# Patient Record
Sex: MALE | Race: BLACK OR AFRICAN AMERICAN
[De-identification: names, ages, dates, MRNs, and addresses within clinical notes are randomized per-mention and may not be internally consistent; named-entity substitution may affect disease eponyms.]

---

## 2017-01-20 ENCOUNTER — HOSPITAL ENCOUNTER (EMERGENCY)
Dept: HOSPITAL 62 - ER | Age: 42
LOS: 1 days | Discharge: HOME | End: 2017-01-21
Payer: OTHER GOVERNMENT

## 2017-01-20 DIAGNOSIS — M25.512: Primary | ICD-10-CM

## 2017-01-20 DIAGNOSIS — M79.1: ICD-10-CM
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
## 2017-01-20 PROCEDURE — 99283 EMERGENCY DEPT VISIT LOW MDM: CPT
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
## 2017-01-21 VITALS — DIASTOLIC BLOOD PRESSURE: 74 MMHG | SYSTOLIC BLOOD PRESSURE: 107 MMHG

## 2018-03-02 ENCOUNTER — HOSPITAL ENCOUNTER (EMERGENCY)
Dept: HOSPITAL 62 - ER | Age: 43
Discharge: HOME | End: 2018-03-02
Payer: OTHER GOVERNMENT

## 2018-03-02 VITALS — SYSTOLIC BLOOD PRESSURE: 121 MMHG | DIASTOLIC BLOOD PRESSURE: 91 MMHG

## 2018-03-02 DIAGNOSIS — G89.29: ICD-10-CM

## 2018-03-02 DIAGNOSIS — M54.5: Primary | ICD-10-CM

## 2018-03-02 PROCEDURE — 72110 X-RAY EXAM L-2 SPINE 4/>VWS: CPT

## 2018-03-02 PROCEDURE — 96372 THER/PROPH/DIAG INJ SC/IM: CPT

## 2018-03-02 PROCEDURE — 99283 EMERGENCY DEPT VISIT LOW MDM: CPT

## 2018-03-02 NOTE — RADIOLOGY REPORT (SQ)
EXAM DESCRIPTION:  L SPINE WHOLE



COMPLETED DATE/TIME:  3/2/2018 5:42 pm



REASON FOR STUDY:  low back pain



COMPARISON:  None.



NUMBER OF VIEWS:  Five views including obliques.



TECHNIQUE:  AP, lateral, oblique, and sacral radiographic images acquired of the lumbar spine.



LIMITATIONS:  None.



FINDINGS:  MINERALIZATION: Normal.

SEGMENTATION: Normal.  No transitional anatomy.

ALIGNMENT: Normal.

VERTEBRAE: Maintained height.  No fracture or worrisome bone lesion.

DISCS: Multilevel disc space narrowing with osteophytes.

POSTERIOR ELEMENTS: Pedicles and facets are intact.  No pars defect or posterior arch defects. Facet 
arthropathy is present.

HARDWARE: None in the spine.

PARASPINAL SOFT TISSUES: Normal.

PELVIS: Intact as visualized. No fractures or worrisome bone lesions. SI joints intact.

OTHER: No other significant finding.



IMPRESSION:  SPONDYLOSIS WITHOUT BONE LESION OR FRACTURE.



TECHNICAL DOCUMENTATION:  JOB ID:  [id number]

[id number]

 2011 [employer]- All Rights Reserved



Reading location - IP/workstation name: [email or web address]

## 2019-12-26 ENCOUNTER — HOSPITAL ENCOUNTER (INPATIENT)
Dept: HOSPITAL 62 - ER | Age: 44
LOS: 7 days | Discharge: HOME | DRG: 897 | End: 2020-01-02
Attending: INTERNAL MEDICINE | Admitting: INTERNAL MEDICINE
Payer: OTHER GOVERNMENT

## 2019-12-26 DIAGNOSIS — G47.33: ICD-10-CM

## 2019-12-26 DIAGNOSIS — N18.9: ICD-10-CM

## 2019-12-26 DIAGNOSIS — E11.22: ICD-10-CM

## 2019-12-26 DIAGNOSIS — Z79.4: ICD-10-CM

## 2019-12-26 DIAGNOSIS — F10.129: Primary | ICD-10-CM

## 2019-12-26 DIAGNOSIS — F13.929: ICD-10-CM

## 2019-12-26 DIAGNOSIS — Z88.6: ICD-10-CM
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
## 2019-12-26 DIAGNOSIS — Z91.14: ICD-10-CM

## 2019-12-26 DIAGNOSIS — Z87.820: ICD-10-CM

## 2019-12-26 DIAGNOSIS — N17.9: ICD-10-CM

## 2019-12-26 DIAGNOSIS — F17.210: ICD-10-CM

## 2019-12-26 DIAGNOSIS — I12.9: ICD-10-CM

## 2019-12-26 DIAGNOSIS — F43.10: ICD-10-CM

## 2019-12-26 DIAGNOSIS — F32.9: ICD-10-CM

## 2019-12-26 LAB
ADD MANUAL DIFF: NO
ALBUMIN SERPL-MCNC: 4.9 G/DL (ref 3.5–5)
ALP SERPL-CCNC: 53 U/L (ref 38–126)
ANION GAP SERPL CALC-SCNC: 16 MMOL/L (ref 5–19)
APAP SERPL-MCNC: < 10 UG/ML (ref 10–30)
APPEARANCE UR: CLEAR
APTT PPP: (no result) S
AST SERPL-CCNC: 127 U/L (ref 17–59)
BARBITURATES UR QL SCN: NEGATIVE
BASE EXCESS BLDV CALC-SCNC: -1.7 MMOL/L
BASOPHILS # BLD AUTO: 0.1 10^3/UL (ref 0–0.2)
BASOPHILS NFR BLD AUTO: 1.4 % (ref 0–2)
BILIRUB DIRECT SERPL-MCNC: 0.1 MG/DL (ref 0–0.4)
BILIRUB SERPL-MCNC: 0.4 MG/DL (ref 0.2–1.3)
BILIRUB UR QL STRIP: NEGATIVE
BUN SERPL-MCNC: 9 MG/DL (ref 7–20)
CALCIUM: 9 MG/DL (ref 8.4–10.2)
CHLORIDE SERPL-SCNC: 104 MMOL/L (ref 98–107)
CK SERPL-CCNC: 705 U/L (ref 55–170)
CO2 SERPL-SCNC: 24 MMOL/L (ref 22–30)
EOSINOPHIL # BLD AUTO: 0.1 10^3/UL (ref 0–0.6)
EOSINOPHIL NFR BLD AUTO: 3.1 % (ref 0–6)
ERYTHROCYTE [DISTWIDTH] IN BLOOD BY AUTOMATED COUNT: 13.2 % (ref 11.5–14)
GLUCOSE SERPL-MCNC: 129 MG/DL (ref 75–110)
GLUCOSE UR STRIP-MCNC: NEGATIVE MG/DL
HCO3 BLDV-SCNC: 24.9 MMOL/L (ref 20–32)
HCT VFR BLD CALC: 40.4 % (ref 37.9–51)
HGB BLD-MCNC: 13.6 G/DL (ref 13.5–17)
KETONES UR STRIP-MCNC: NEGATIVE MG/DL
LYMPHOCYTES # BLD AUTO: 1.9 10^3/UL (ref 0.5–4.7)
LYMPHOCYTES NFR BLD AUTO: 50.4 % (ref 13–45)
MCH RBC QN AUTO: 30.3 PG (ref 27–33.4)
MCHC RBC AUTO-ENTMCNC: 33.7 G/DL (ref 32–36)
MCV RBC AUTO: 90 FL (ref 80–97)
METHADONE UR QL SCN: NEGATIVE
MONOCYTES # BLD AUTO: 0.4 10^3/UL (ref 0.1–1.4)
MONOCYTES NFR BLD AUTO: 11 % (ref 3–13)
NEUTROPHILS # BLD AUTO: 1.3 10^3/UL (ref 1.7–8.2)
NEUTS SEG NFR BLD AUTO: 34.1 % (ref 42–78)
PCO2 BLDV: 49 MMHG (ref 35–63)
PCP UR QL SCN: NEGATIVE
PH BLDV: 7.32 [PH] (ref 7.3–7.42)
PH UR STRIP: 6 [PH] (ref 5–9)
PLATELET # BLD: 207 10^3/UL (ref 150–450)
POTASSIUM SERPL-SCNC: 4.1 MMOL/L (ref 3.6–5)
PROT SERPL-MCNC: 9.2 G/DL (ref 6.3–8.2)
PROT UR STRIP-MCNC: 30 MG/DL
RBC # BLD AUTO: 4.49 10^6/UL (ref 4.35–5.55)
SALICYLATES SERPL-MCNC: < 1 MG/DL (ref 2–20)
SP GR UR STRIP: 1
TOTAL CELLS COUNTED % (AUTO): 100 %
URINE AMPHETAMINES SCREEN: NEGATIVE
URINE BENZODIAZEPINES SCREEN: NEGATIVE
URINE COCAINE SCREEN: NEGATIVE
URINE MARIJUANA (THC) SCREEN: NEGATIVE
UROBILINOGEN UR-MCNC: NEGATIVE MG/DL (ref ?–2)
WBC # BLD AUTO: 3.8 10^3/UL (ref 4–10.5)

## 2019-12-26 PROCEDURE — 99291 CRITICAL CARE FIRST HOUR: CPT

## 2019-12-26 PROCEDURE — 82550 ASSAY OF CK (CPK): CPT

## 2019-12-26 PROCEDURE — 51702 INSERT TEMP BLADDER CATH: CPT

## 2019-12-26 PROCEDURE — 83880 ASSAY OF NATRIURETIC PEPTIDE: CPT

## 2019-12-26 PROCEDURE — 94660 CPAP INITIATION&MGMT: CPT

## 2019-12-26 PROCEDURE — 94002 VENT MGMT INPAT INIT DAY: CPT

## 2019-12-26 PROCEDURE — 71045 X-RAY EXAM CHEST 1 VIEW: CPT

## 2019-12-26 PROCEDURE — 70450 CT HEAD/BRAIN W/O DYE: CPT

## 2019-12-26 PROCEDURE — 96374 THER/PROPH/DIAG INJ IV PUSH: CPT

## 2019-12-26 PROCEDURE — 94003 VENT MGMT INPAT SUBQ DAY: CPT

## 2019-12-26 PROCEDURE — 5A1935Z RESPIRATORY VENTILATION, LESS THAN 24 CONSECUTIVE HOURS: ICD-10-PCS

## 2019-12-26 PROCEDURE — 93005 ELECTROCARDIOGRAM TRACING: CPT

## 2019-12-26 PROCEDURE — 87040 BLOOD CULTURE FOR BACTERIA: CPT

## 2019-12-26 PROCEDURE — 83735 ASSAY OF MAGNESIUM: CPT

## 2019-12-26 PROCEDURE — 96361 HYDRATE IV INFUSION ADD-ON: CPT

## 2019-12-26 PROCEDURE — 74177 CT ABD & PELVIS W/CONTRAST: CPT

## 2019-12-26 PROCEDURE — 84443 ASSAY THYROID STIM HORMONE: CPT

## 2019-12-26 PROCEDURE — 96375 TX/PRO/DX INJ NEW DRUG ADDON: CPT

## 2019-12-26 PROCEDURE — 99292 CRITICAL CARE ADDL 30 MIN: CPT

## 2019-12-26 PROCEDURE — 81001 URINALYSIS AUTO W/SCOPE: CPT

## 2019-12-26 PROCEDURE — 85025 COMPLETE CBC W/AUTO DIFF WBC: CPT

## 2019-12-26 PROCEDURE — 84484 ASSAY OF TROPONIN QUANT: CPT

## 2019-12-26 PROCEDURE — 84100 ASSAY OF PHOSPHORUS: CPT

## 2019-12-26 PROCEDURE — 84439 ASSAY OF FREE THYROXINE: CPT

## 2019-12-26 PROCEDURE — 36415 COLL VENOUS BLD VENIPUNCTURE: CPT

## 2019-12-26 PROCEDURE — 93010 ELECTROCARDIOGRAM REPORT: CPT

## 2019-12-26 PROCEDURE — 71260 CT THORAX DX C+: CPT

## 2019-12-26 PROCEDURE — 72125 CT NECK SPINE W/O DYE: CPT

## 2019-12-26 PROCEDURE — 84481 FREE ASSAY (FT-3): CPT

## 2019-12-26 PROCEDURE — 80307 DRUG TEST PRSMV CHEM ANLYZR: CPT

## 2019-12-26 PROCEDURE — C9113 INJ PANTOPRAZOLE SODIUM, VIA: HCPCS

## 2019-12-26 PROCEDURE — 83605 ASSAY OF LACTIC ACID: CPT

## 2019-12-26 PROCEDURE — 99285 EMERGENCY DEPT VISIT HI MDM: CPT

## 2019-12-26 PROCEDURE — 0BH17EZ INSERTION OF ENDOTRACHEAL AIRWAY INTO TRACHEA, VIA NATURAL OR ARTIFICIAL OPENING: ICD-10-PCS

## 2019-12-26 PROCEDURE — 82803 BLOOD GASES ANY COMBINATION: CPT

## 2019-12-26 PROCEDURE — 74018 RADEX ABDOMEN 1 VIEW: CPT

## 2019-12-26 PROCEDURE — 80053 COMPREHEN METABOLIC PANEL: CPT

## 2019-12-26 PROCEDURE — 80048 BASIC METABOLIC PNL TOTAL CA: CPT

## 2019-12-26 RX ADMIN — PROPOFOL PRN MLS/HR: 10 INJECTION, EMULSION INTRAVENOUS at 20:40
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
## 2019-12-26 NOTE — RADIOLOGY REPORT (SQ)
EXAM DESCRIPTION:





CLINICAL HISTORY:

44 years Male, intubated



COMPARISON:

CT chest from today.



FINDINGS:

Mild-to-moderate cardiomegaly. Mild mediastinal widening mostly

due to vascular structures as seen on CT. No significant findings

on chest x-ray. CT chest demonstrated mild-to-moderate bilateral

lower lobe consolidations. Endotracheal tube well above the

brady. No suspicious pneumothorax.

## 2019-12-26 NOTE — RADIOLOGY REPORT (SQ)
EXAM DESCRIPTION: 



XR ABDOMEN 1 VIEW (KUB)



COMPLETED DATE/TME:  12/26/2019 00:00



CLINICAL HISTORY: 



44 years, Male, verify placement of ng tube 



COMPARISON:

None.



NUMBER OF VIEWS:

1



TECHNIQUE:

AP abdomen



LIMITATIONS:

None.



FINDINGS:



Residual contrast in the collecting system. Overlying cardiac

leads and wires. The bowel gas pattern is nonspecific. Evaluation

for free air limited on a supine view. Tip of the enteric tube in

the left upper quadrant/stomach.



IMPRESSION:



Nonspecific bowel gas pattern. Tip of the enteric tube in the

stomach.

 



copyright 2011 [employer]- All Rights Reserved

## 2019-12-26 NOTE — RADIOLOGY REPORT (SQ)
EXAM DESCRIPTION: 



CT CHEST WITH IV CONTRAST, CT ABDOMEN PELVIS WITH IV CONTRAST



COMPLETED DATE/TME:  12/26/2019 20:48



CLINICAL HISTORY: 



44 years, Male, ams



COMPARISON:

None.



TECHNIQUE:

744  Images stored on PACS.

 

All CT scanners at this facility use dose modulation, iterative

reconstruction, and/or weight based dosing when appropriate to

reduce radiation dose to as low as reasonably achievable (ALARA).





[facility]: Dose Right [facility]: CareDose   [facility]: Dose Right    [facility]:

Teradose 4D    [facility]: Smart Technologies



LIMITATIONS:

None.



FINDINGS:



CT chest:



Endotracheal tube partially visualized. No mediastinal or hilar

adenopathy. Heart and pericardium are unremarkable. Small amount

of fluid in the distal esophagus. Osseous structures of the

thorax are grossly intact. No pneumothorax. Dependent atelectasis

in each lung base.



CT abdomen/pelvis:



Osseous structures are grossly intact. Fatty infiltrative change

to the liver. The spleen, adrenal glands, pancreas, kidneys are

unremarkable. The gallbladder is present. No gross evidence for

bowel obstruction. Fat-containing periumbilical hernia. Normal

appendix. López catheter in urinary bladder. Air in the bladder

likely reflects iatrogenic air. No free air or free fluid.





IMPRESSION:



No acute intrathoracic process. No acute intra-abdominal/pelvic

process.

 

TECHNICAL DOCUMENTATION:



Quality ID # 436: Final reports with documentation of one or more

dose reduction techniques (e.g., Automated exposure control,

adjustment of the mA and/or kV according to patient size, use of

iterative reconstruction technique)



copyright 2011 [employer]- All Rights Reserved

## 2019-12-26 NOTE — RADIOLOGY REPORT (SQ)
EXAM DESCRIPTION: 



CT HEAD WITHOUT IV CONTRAST



COMPLETED DATE/TME:  12/26/2019 20:45



CLINICAL HISTORY: 



44 years, Male, ams



COMPARISON:

None.



TECHNIQUE:

209  Images stored on PACS.

 

All CT scanners at this facility use dose modulation, iterative

reconstruction, and/or weight based dosing when appropriate to

reduce radiation dose to as low as reasonably achievable (ALARA).





[facility]: Dose Right [facility]: CareDose   [facility]: Dose Right    [facility]:

Teradose 4D    [facility]: Smart [employer]



LIMITATIONS:

None.



FINDINGS:



The globes are intact. Partial visualization of endotracheal and

enteric tubes. Mucosal thickening of the ethmoid air cells.

Air-fluid level left maxillary sinus. No displaced or depressed

skull fracture. No intra or extra-axial hemorrhage. CT is limited

for evaluation of acute infarct. No CT evidence for large or

territorial acute infarct. No mass. No midline shift





IMPRESSION:



No acute intracranial abnormality

 

TECHNICAL DOCUMENTATION:



Quality ID # 436: Final reports with documentation of one or more

dose reduction techniques (e.g., Automated exposure control,

adjustment of the mA and/or kV according to patient size, use of

iterative reconstruction technique)



copyright 2011 [employer]- All Rights Reserved

## 2019-12-26 NOTE — RADIOLOGY REPORT (SQ)
EXAM DESCRIPTION:



 CT of the cervical spine without contrast.



CLINICAL HISTORY: 



44 years  Male  ams



COMPARISON: 



None



TECHNIQUE: 



Axial images without contrast. Sagittal coronal reconstruction.

This exam was performed according to our departmental

dose-optimization program, which includes automated exposure

control, adjustment of the mA and/or kV according to patient size

and/or use of iterative reconstruction technique..



FINDINGS: 



No obvious acute fracture dislocation. Minimal retrolisthesis at

C5-6 where probably degenerative and associated with mild diffuse

disc bulge and stenosis. No foraminal stenosis. Other levels are

unremarkable. No suspicious prevertebral soft tissue swelling.

Endotracheal tube is identified.



IMPRESSION: 



No significant findings in the cervical spine. Mild degenerative

changes at C5-6.

## 2019-12-26 NOTE — RADIOLOGY REPORT (SQ)
EXAM DESCRIPTION: 



CT CHEST WITH IV CONTRAST, CT ABDOMEN PELVIS WITH IV CONTRAST



COMPLETED DATE/TME:  12/26/2019 20:48



CLINICAL HISTORY: 



44 years, Male, ams



COMPARISON:

None.



TECHNIQUE:

744  Images stored on PACS.

 

All CT scanners at this facility use dose modulation, iterative

reconstruction, and/or weight based dosing when appropriate to

reduce radiation dose to as low as reasonably achievable (ALARA).





[facility]: Dose Right [facility]: CareDose   [facility]: Dose Right    [facility]:

Teradose 4D    [facility]: [employer]



LIMITATIONS:

None.



FINDINGS:



CT chest:



Endotracheal tube partially visualized. No mediastinal or hilar

adenopathy. Heart and pericardium are unremarkable. Small amount

of fluid in the distal esophagus. Osseous structures of the

thorax are grossly intact. No pneumothorax. Dependent atelectasis

in each lung base.



CT abdomen/pelvis:



Osseous structures are grossly intact. Fatty infiltrative change

to the liver. The spleen, adrenal glands, pancreas, kidneys are

unremarkable. The gallbladder is present. No gross evidence for

bowel obstruction. Fat-containing periumbilical hernia. Normal

appendix. López catheter in urinary bladder. Air in the bladder

likely reflects iatrogenic air. No free air or free fluid.





IMPRESSION:



No acute intrathoracic process. No acute intra-abdominal/pelvic

process.

 

TECHNICAL DOCUMENTATION:



Quality ID # 436: Final reports with documentation of one or more

dose reduction techniques (e.g., Automated exposure control,

adjustment of the mA and/or kV according to patient size, use of

iterative reconstruction technique)



copyright 2011 [employer]- All Rights Reserved

## 2019-12-27 LAB
ADD MANUAL DIFF: NO
ALBUMIN SERPL-MCNC: 3.8 G/DL (ref 3.5–5)
ALP SERPL-CCNC: 39 U/L (ref 38–126)
ANION GAP SERPL CALC-SCNC: 15 MMOL/L (ref 5–19)
ARTERIAL BLOOD FIO2: (no result)
ARTERIAL BLOOD H2CO3: 1.12 MMOL/L (ref 1.05–1.35)
ARTERIAL BLOOD HCO3: 20.5 MMOL/L (ref 20–24)
ARTERIAL BLOOD PCO2: 37.1 MMHG (ref 35–45)
ARTERIAL BLOOD PH: 7.36 (ref 7.35–7.45)
ARTERIAL BLOOD PO2: 239.9 MMHG (ref 80–100)
ARTERIAL BLOOD TOTAL CO2: 21.7 MMOL/L (ref 23–27)
AST SERPL-CCNC: 122 U/L (ref 17–59)
BASE EXCESS BLDA CALC-SCNC: -4.3 MMOL/L
BASOPHILS # BLD AUTO: 0 10^3/UL (ref 0–0.2)
BASOPHILS NFR BLD AUTO: 0.7 % (ref 0–2)
BILIRUB DIRECT SERPL-MCNC: 0.2 MG/DL (ref 0–0.4)
BILIRUB SERPL-MCNC: 0.4 MG/DL (ref 0.2–1.3)
BUN SERPL-MCNC: 9 MG/DL (ref 7–20)
CALCIUM: 7.5 MG/DL (ref 8.4–10.2)
CHLORIDE SERPL-SCNC: 110 MMOL/L (ref 98–107)
CO2 SERPL-SCNC: 19 MMOL/L (ref 22–30)
EOSINOPHIL # BLD AUTO: 0 10^3/UL (ref 0–0.6)
EOSINOPHIL NFR BLD AUTO: 0.3 % (ref 0–6)
ERYTHROCYTE [DISTWIDTH] IN BLOOD BY AUTOMATED COUNT: 13.3 % (ref 11.5–14)
FREE T4 (FREE THYROXINE): 1.08 NG/DL (ref 0.78–2.19)
GLUCOSE SERPL-MCNC: 120 MG/DL (ref 75–110)
HCT VFR BLD CALC: 35.6 % (ref 37.9–51)
HGB BLD-MCNC: 11.9 G/DL (ref 13.5–17)
LYMPHOCYTES # BLD AUTO: 1.1 10^3/UL (ref 0.5–4.7)
LYMPHOCYTES NFR BLD AUTO: 29.6 % (ref 13–45)
MCH RBC QN AUTO: 30.3 PG (ref 27–33.4)
MCHC RBC AUTO-ENTMCNC: 33.5 G/DL (ref 32–36)
MCV RBC AUTO: 90 FL (ref 80–97)
MONOCYTES # BLD AUTO: 0.1 10^3/UL (ref 0.1–1.4)
MONOCYTES NFR BLD AUTO: 3.4 % (ref 3–13)
NEUTROPHILS # BLD AUTO: 2.4 10^3/UL (ref 1.7–8.2)
NEUTS SEG NFR BLD AUTO: 66 % (ref 42–78)
PHOSPHATE SERPL-MCNC: 4.3 MG/DL (ref 2.5–4.5)
PLATELET # BLD: 181 10^3/UL (ref 150–450)
POTASSIUM SERPL-SCNC: 4.4 MMOL/L (ref 3.6–5)
PROT SERPL-MCNC: 7.5 G/DL (ref 6.3–8.2)
RBC # BLD AUTO: 3.94 10^6/UL (ref 4.35–5.55)
SAO2 % BLDA: 99.5 % (ref 94–98)
T3FREE SERPL-MCNC: 4.98 PG/ML (ref 2.77–5.27)
TOTAL CELLS COUNTED % (AUTO): 100 %
WBC # BLD AUTO: 3.7 10^3/UL (ref 4–10.5)

## 2019-12-27 RX ADMIN — MULTIVITAMIN TABLET SCH TAB: TABLET at 12:01

## 2019-12-27 RX ADMIN — Medication SCH: at 14:16

## 2019-12-27 RX ADMIN — LORAZEPAM PRN MG: 2 INJECTION INTRAMUSCULAR; INTRAVENOUS at 18:55

## 2019-12-27 RX ADMIN — LORAZEPAM PRN MG: 2 INJECTION INTRAMUSCULAR; INTRAVENOUS at 14:16

## 2019-12-27 RX ADMIN — Medication SCH MG: at 12:01

## 2019-12-27 RX ADMIN — Medication SCH: at 21:42

## 2019-12-27 RX ADMIN — FOLIC ACID SCH MG: 1 TABLET ORAL at 12:01

## 2019-12-27 RX ADMIN — PROPOFOL PRN MLS/HR: 10 INJECTION, EMULSION INTRAVENOUS at 01:43

## 2019-12-27 RX ADMIN — PROPOFOL PRN MLS/HR: 10 INJECTION, EMULSION INTRAVENOUS at 05:32

## 2019-12-27 RX ADMIN — LORAZEPAM PRN MG: 2 INJECTION INTRAMUSCULAR; INTRAVENOUS at 23:50

## 2019-12-27 RX ADMIN — HEPARIN SODIUM SCH UNIT: 5000 INJECTION, SOLUTION INTRAVENOUS; SUBCUTANEOUS at 14:16

## 2019-12-27 RX ADMIN — Medication SCH: at 08:25

## 2019-12-27 RX ADMIN — LORAZEPAM PRN MG: 2 INJECTION INTRAMUSCULAR; INTRAVENOUS at 11:11

## 2019-12-27 RX ADMIN — HEPARIN SODIUM SCH UNIT: 5000 INJECTION, SOLUTION INTRAVENOUS; SUBCUTANEOUS at 21:40

## 2019-12-27 NOTE — PDOC CRITICAL CARE PROG REPORT
General


Date:: 12/27/19 - Critical Care Attending Note


Resuscitation Status: Full Code


Events in the past 12 to 24 Hours:: 





I extubated the pt today. Pt has episoded of voluntary shaking and jerking of 

his lower legs that last a few seconds but are not followed by a post-ictal 

state. Wife states that pt has PTSD and a TBI. He has BENNY for which he is 

supposed to be on CPAP. [facility] states [facility] but is non-

compliant with his meds and his CPAP.Staff reports hearing pt say that he wants 

to die and that he "took a bunch of pills".


Reason for ICU Addmission:: AMS, unresponsive, ETOH intoxication





Physical Exam


Vital Signs: 


                                        











Temp Pulse Resp BP Pulse Ox


 


 99.5 F   101 H  14   123/77   100 


 


 12/27/19 12:00  12/27/19 12:00  12/27/19 12:00  12/27/19 12:00  12/27/19 12:00








                                 Intake & Output











 12/26/19 12/27/19 12/28/19





 06:59 06:59 06:59


 


Intake Total  1837 1000


 


Output Total  2200 1345


 


Balance  -363 -345


 


Weight  105.7 kg 








                                  Weight/Height





Weight                           105.7 kg


Height                           6 ft 3 in








General appearance: PRESENT: no acute distress, well-developed, well-nourished


Head exam: PRESENT: atraumatic, normocephalic


Respiratory exam: PRESENT: clear to auscultation elisa, unlabored


GI/Abdominal exam: PRESENT: soft, other - NTND


Extremities exam: PRESENT: other - no edema


Psychiatric exam: PRESENT: unusual affect





Laboratory/Radiographs


Laboratory Results: 


                                        





                                 12/27/19 01:56 





                                 12/27/19 01:56 





                                        











  12/26/19 12/26/19 12/26/19





  20:12 20:12 20:12


 


WBC  3.8 L  


 


RBC  4.49  


 


Hgb  13.6  


 


Hct  40.4  


 


MCV  90  


 


MCH  30.3  


 


MCHC  33.7  


 


RDW  13.2  


 


Plt Count  207  


 


Seg Neutrophils %  34.1 L  


 


Carbonic Acid   


 


HCO3/H2CO3 Ratio   


 


ABG pH   


 


ABG pCO2   


 


ABG pO2   


 


ABG HCO3   


 


ABG O2 Saturation   


 


ABG Base Excess   


 


VBG pH   


 


VBG pCO2   


 


VBG HCO3   


 


VBG Base Excess   


 


FiO2   


 


Sodium   144.3 


 


Potassium   4.1 


 


Chloride   104 


 


Carbon Dioxide   24 


 


Anion Gap   16 


 


BUN   9 


 


Creatinine   1.50 H 


 


Est GFR ( Amer)   > 60 


 


Glucose   129 H 


 


Lactic Acid    2.9 H


 


Calcium   9.0 


 


Phosphorus   


 


Magnesium   


 


Total Bilirubin   0.4 


 


AST   127 H 


 


Alkaline Phosphatase   53 


 


Total Protein   9.2 H 


 


Albumin   4.9 


 


TSH   


 


Free T4   


 


Free T3 pg/mL   


 


Urine Color   


 


Urine Appearance   


 


Urine pH   


 


Ur Specific Gravity   


 


Urine Protein   


 


Urine Glucose (UA)   


 


Urine Ketones   


 


Urine Blood   


 


Urine RBC (Auto)   














  12/26/19 12/26/19 12/26/19





  20:12 20:12 20:47


 


WBC   


 


RBC   


 


Hgb   


 


Hct   


 


MCV   


 


MCH   


 


MCHC   


 


RDW   


 


Plt Count   


 


Seg Neutrophils %   


 


Carbonic Acid   


 


HCO3/H2CO3 Ratio   


 


ABG pH   


 


ABG pCO2   


 


ABG pO2   


 


ABG HCO3   


 


ABG O2 Saturation   


 


ABG Base Excess   


 


VBG pH  7.32  


 


VBG pCO2  49.0  


 


VBG HCO3  24.9  


 


VBG Base Excess  -1.7  


 


FiO2   


 


Sodium   


 


Potassium   


 


Chloride   


 


Carbon Dioxide   


 


Anion Gap   


 


BUN   


 


Creatinine   


 


Est GFR (African Amer)   


 


Glucose   


 


Lactic Acid   


 


Calcium   


 


Phosphorus   


 


Magnesium   


 


Total Bilirubin   


 


AST   


 


Alkaline Phosphatase   


 


Total Protein   


 


Albumin   


 


TSH   0.38 L 


 


Free T4   


 


Free T3 pg/mL   


 


Urine Color    STRAW


 


Urine Appearance    CLEAR


 


Urine pH    6.0


 


Ur Specific Gravity    1.005


 


Urine Protein    30 H


 


Urine Glucose (UA)    NEGATIVE


 


Urine Ketones    NEGATIVE


 


Urine Blood    SMALL H


 


Urine RBC (Auto)    0














  12/27/19 12/27/19 12/27/19





  01:56 01:56 01:56


 


WBC  3.7 L  


 


RBC  3.94 L  


 


Hgb  11.9 L  


 


Hct  35.6 L  


 


MCV  90  


 


MCH  30.3  


 


MCHC  33.5  


 


RDW  13.3  


 


Plt Count  181  


 


Seg Neutrophils %  66.0  


 


Carbonic Acid   


 


HCO3/H2CO3 Ratio   


 


ABG pH   


 


ABG pCO2   


 


ABG pO2   


 


ABG HCO3   


 


ABG O2 Saturation   


 


ABG Base Excess   


 


VBG pH   


 


VBG pCO2   


 


VBG HCO3   


 


VBG Base Excess   


 


FiO2   


 


Sodium   144.4 


 


Potassium   4.4 


 


Chloride   110 H 


 


Carbon Dioxide   19 L 


 


Anion Gap   15 


 


BUN   9 


 


Creatinine   1.55 H 


 


Est GFR ( Amer)   > 60 


 


Glucose   120 H 


 


Lactic Acid    3.3 H


 


Calcium   7.5 L 


 


Phosphorus   4.3 


 


Magnesium   2.0 


 


Total Bilirubin   0.4 


 


AST   122 H 


 


Alkaline Phosphatase   39 


 


Total Protein   7.5 


 


Albumin   3.8 


 


TSH   


 


Free T4   


 


Free T3 pg/mL   


 


Urine Color   


 


Urine Appearance   


 


Urine pH   


 


Ur Specific Gravity   


 


Urine Protein   


 


Urine Glucose (UA)   


 


Urine Ketones   


 


Urine Blood   


 


Urine RBC (Auto)   














  12/27/19 12/27/19





  01:56 03:00


 


WBC  


 


RBC  


 


Hgb  


 


Hct  


 


MCV  


 


MCH  


 


MCHC  


 


RDW  


 


Plt Count  


 


Seg Neutrophils %  


 


Carbonic Acid   1.12


 


HCO3/H2CO3 Ratio   18:1


 


ABG pH   7.36


 


ABG pCO2   37.1


 


ABG pO2   239.9 H


 


ABG HCO3   20.5


 


ABG O2 Saturation   99.5 H


 


ABG Base Excess   -4.3


 


VBG pH  


 


VBG pCO2  


 


VBG HCO3  


 


VBG Base Excess  


 


FiO2   50%


 


Sodium  


 


Potassium  


 


Chloride  


 


Carbon Dioxide  


 


Anion Gap  


 


BUN  


 


Creatinine  


 


Est GFR (African Amer)  


 


Glucose  


 


Lactic Acid  


 


Calcium  


 


Phosphorus  


 


Magnesium  


 


Total Bilirubin  


 


AST  


 


Alkaline Phosphatase  


 


Total Protein  


 


Albumin  


 


TSH  


 


Free T4  1.08 


 


Free T3 pg/mL  4.98 


 


Urine Color  


 


Urine Appearance  


 


Urine pH  


 


Ur Specific Gravity  


 


Urine Protein  


 


Urine Glucose (UA)  


 


Urine Ketones  


 


Urine Blood  


 


Urine RBC (Auto)  








                                        











  12/26/19 12/26/19 12/26/19





  20:12 20:12 20:12


 


Creatine Kinase  705 H  


 


Troponin I   < 0.012 


 


NT-Pro-B Natriuret Pep    < 11











Impressions: 


                                        





KUB X-Ray  12/26/19 00:00


IMPRESSION:


 


Nonspecific bowel gas pattern. Tip of the enteric tube in the


stomach.


 


 


copyright 2011 [employer]- All Rights Reserved


 








Head CT  12/26/19 20:45


IMPRESSION:


 


No acute intracranial abnormality


 


TECHNICAL DOCUMENTATION:


 


Quality ID # [id number]: Final reports with documentation of one or more


dose reduction techniques (e.g., Automated exposure control,


adjustment of the mA and/or kV according to patient size, use of


iterative reconstruction technique)


 


copyright 2011 [employer] All Rights Reserved


 








Cervical Spine CT  12/26/19 20:46


IMPRESSION: 


 


No significant findings in the cervical spine. Mild degenerative


changes at C5-6.


 


 


 








Abdomen/Pelvis CT  12/26/19 20:48


IMPRESSION:


 


No acute intrathoracic process. No acute intra-abdominal/pelvic


process.


 


TECHNICAL DOCUMENTATION:


 


Quality ID # [id number]: Final reports with documentation of one or more


dose reduction techniques (e.g., Automated exposure control,


adjustment of the mA and/or kV according to patient size, use of


iterative reconstruction technique)


 


copyright 2011 [employer] All Rights Reserved


 








Chest CT  12/26/19 20:48


IMPRESSION:


 


No acute intrathoracic process. No acute intra-abdominal/pelvic


process.


 


TECHNICAL DOCUMENTATION:


 


Quality ID # [id number]: Final reports with documentation of one or more


dose reduction techniques (e.g., Automated exposure control,


adjustment of the mA and/or kV according to patient size, use of


iterative reconstruction technique)


 


copyright 2011 [employer]- All Rights Reserved


 








Chest X-Ray  12/27/19 04:00


IMPRESSION:


 


Placement of an enteric tube. Other findings are grossly stable


 


 


copyright 2011 [employer]- All Rights Reserved


 














Assessment and Plan





- Diagnosis


(1) Acute respiratory failure


Is this a current diagnosis for this admission?: Yes   





(2) Alcohol intoxication


Qualifiers: 


   Complication of substance-induced condition: with unspecified complication   

Qualified Code(s): F10.929 - Alcohol use, unspecified with intoxication, 

unspecified   


Is this a current diagnosis for this admission?: Yes   





(3) PTSD (post-traumatic stress disorder)


Is this a current diagnosis for this admission?: Yes   





(4) TBI (traumatic brain injury)


Qualifiers: 


   Encounter type: sequela 


Is this a current diagnosis for this admission?: No   





(5) Depression


Qualifiers: 


   Depression Type: unspecified   Qualified Code(s): F32.9 - Major depressive 

disorder, single episode, unspecified   


Is this a current diagnosis for this admission?: Yes   


Plan Summary: 





Assessment: 45 yo man with acute respiratory failure due to ETOH intoxication. 

Pt also with h/o depression, PTSD, and TBI.





Plan:


1. Respiratory: acute respiratory failure, resolved. I extubated the pt today. 

He is stable on RA. Pt has BENNY but per his wife he does not wear his CPAP at 

home


2. CV: heart rate and BP acceptable


3. Psych/Social: alcohol intoxicaton, alcohol abuse. Pt also with h/o PTSD, TBI,

depression. Possible suicide attempt. Pt having episodes in which he jerks his 

lower extremities for a few seconds. He is not post-ictal after these episodes. 

I do not think these represent seizure activity. Will continue with the CIWA 

protocol. Will start MVI, folic acid, thiamine. Will consult psychiatry and 

place on suicide precautions


4. ID: no source of infection, ATBX stopped


5. Renal: JONAH/CKD. Cr 1.55. Continue to monitor


6. Nutrition: regular diet


7. Prophylaxis: sq heparin





Critical Time


Critical Time (minutes): 33


Level of Care: ICU


-: 


1.  The care of a critical patient is a dynamic process.  This note is a 

representative synopsis but static in nature.  The timeframe for treatments 

given in order is not necessarily the actual time these treatments may have been

done.





2.  This patient requires critical care secondary to ongoing requirements for 

therapy not offered or safe outside the critical care environment.  Transfer to 

a lower level of care will result in altered life or limb morbidity and 

mortality.





3.  Multidisciplinary rounds completed.





4.  ABCDE bundle addressed.

## 2019-12-27 NOTE — CRITICAL CARE ADMISSION REPORT
HPI


Date:: 12/27/19


Time:: 00:22


Reason for ICU Reason:: AMS, unresponsive, ETOH intoxication


HPI: 


Mr. Alejandra is a 44yr old M with PMH of TBI in 2010 without residual deficits, 

PTSD and heavy ETOH daily use per wife. Per ED reports and information obtained 

from wife, pt was on his way to [place] and was found in the airport 

unresponsive. EMS was called and pt was brought to the ED. He had no response to

narcan trial and decision was made to intubate in the ED for airway protection. 

Head/neck/chest/abd/pelvis CT scan was grossly negative. Wife reports that pt 

drinks vodka and beer daily but states he attempts to hide his habit from her 

but hiding the large Liter vodka bottles around the house; she is unable to 

estimate the average amount of alcohol intake daily but states she believes he 

has been drinking since about 2012. He is prescribed medications for PTSD but 

she states he rarely takes these and prefers drinking. She denies pt complaining

of pain, feeling ill or having symptoms of illness prior to this episode.





- Diagnosis/Plan


(1) Alcohol intoxication


Qualifiers: 


   Complication of substance-induced condition: with unspecified complication   

Qualified Code(s): F10.929 - Alcohol use, unspecified with intoxication, 

unspecified   


Is this a current diagnosis for this admission?: Yes   


Plan: 


Maintain sedation with propofol while mechanically vented, continue IV flluids, 

thiamine and folate








(2) Acute respiratory failure


Is this a current diagnosis for this admission?: Yes   


Plan: 


Continue MV support, titrate FiO2 as tolerated, suction PRN, Chest CT showing 

mild-moderate bilateral lower lobe effusions - repeat chest Xray in the AM. ETT 

advanced 2cm from initial position after post intubation Xray. 








(3) Alcohol abuse, daily use


Is this a current diagnosis for this admission?: Yes   


Plan: 


CIWA assessment q4h with ordered Ativan treatment PRN in anticipation of acute 

withdrawal, consider assessment for inpatient/outpatient services if willing to 

accept








(4) PTSD (post-traumatic stress disorder)


Is this a current diagnosis for this admission?: Yes   


Plan: 


Not currently taking prescribed medications per wife, consider psych eval if 

necessary once over acute phase, offer outpatient services if needed








(5) TBI (traumatic brain injury)


Is this a current diagnosis for this admission?: Yes   


Plan: 


Hx of TBI without reported deficits, CT head/neck negative








(6) JONAH (acute kidney injury)


Is this a current diagnosis for this admission?: Yes   


Plan: 


Current Cr 1.50 which is slightly elevated to his baseline of ~1.4, moitor I&O, 

trend renal indices, replace lytes PRN








- .


Plan Summary: 


Mr. Alejandra was found in the airport with a blood alcohol level >500, while this 

is the likely cause of him being found unresponsive and requiring intubation, he

will still need adequate fluid resusitation and a recheck of his lactate of 

ensure this has gone down. He will be admitted to the ICU for continued 

monitoring while sedated on the MV. CIWA assessment will be initiated in 

anticipation of his likely withdrawal due to his heavy daily drinking of hard 

liquor and beer (amount unknown). Wife updated in the ED and at bedside once pt 

reached the ICU. Informed her that it may be possible that he would remain 

intubated while he goes through the first several days of withdrawal symptoms to

ensure he is able to safely withdrawal from alcohol if he is unable to tolerated

weaning of sedation, she states understanding. 





Past Medical History


Past Medical History: 


As per HPI


Cardiac Medical History: Reports: Hypertension


Neurological Medical History: Reports: Other - TBI 2010


   Denies: Seizures


Psychiatric Medical History: Reports: Depression, Post Traumatic Stress Disorder


Traumatic Medical History: Reports: Traumatic Brain Injury





Past Surgical History


Past Surgical History: Reports: Orthopedic Surgery - LLE for compartment 

syndrome





Social/Family History





- Social History


Lives with: Family


Smoking Status: Current Every Day Smoker


Frequency of Alcohol Use: Heavy





- Medication/Allergies


Home Medications: 








Phenylephrine HCl [Anusol Suppository] 1 supp.rect TX BID #28 supp.rect 11/04/15




Tramadol HCl  11/04/15 


Acetaminophen with Codeine [Tylenol #3 Tablet] 1 each PO Q4HP PRN #14 tablet 

06/21/16 


Oxycodone HCl/Acetaminophen [Percocet 5-325 mg Tablet] 1 tab PO ASDIR PRN #15 

tablet 03/02/18 








Allergies/Adverse Reactions: 


                                        





No Known Allergies Allergy (Verified 03/02/18 16:23)


   











Review of Systems


ROS unobtainable: Due to endotracheal tube





Physical Exam


Vital Signs: 


                                        











Temp Pulse Resp BP Pulse Ox


 


 97.6 F      15   106/73   100 


 


 12/26/19 23:11     12/26/19 23:11  12/26/19 23:11  12/26/19 23:11








                                 Intake & Output











 12/25/19 12/26/19 12/27/19





 06:59 06:59 06:59


 


Intake Total   1560


 


Balance   1560


 


Weight   104 kg








                                  Weight/Height





Weight                           104 kg


Height                           6 ft 2 in








General appearance: PRESENT: no acute distress, well-developed, well-nourished


Head exam: PRESENT: atraumatic, normocephalic


Eye exam: PRESENT: conjunctiva pink, PERRLA.  ABSENT: scleral icterus


Ear exam: PRESENT: normal external ear exam


Mouth exam: PRESENT: moist, neck supple


Neck exam: ABSENT: tracheal deviation


Respiratory exam: PRESENT: decreased breath sounds - bilateral lower lobes.  AB

SENT: wheezes


Cardiovascular exam: PRESENT: RRR, +S1, +S2


Pulses: PRESENT: normal dorsalis pedis pul


Vascular exam: PRESENT: normal capillary refill


GI/Abdominal exam: PRESENT: normal bowel sounds, soft.  ABSENT: mass, 

organolmegaly


Extremities exam: ABSENT: pedal edema


Skin exam: PRESENT: dry, warm


Tubes/Lines: PRESENT: Endotracheal Tube





Laboratory/Radiographs


Laboratory Results: 


                                        





                                 12/26/19 20:12 





                                 12/26/19 20:12 





                                        











  12/26/19 12/26/19 12/26/19





  20:12 20:12 20:12


 


WBC  3.8 L  


 


RBC  4.49  


 


Hgb  13.6  


 


Hct  40.4  


 


MCV  90  


 


MCH  30.3  


 


MCHC  33.7  


 


RDW  13.2  


 


Plt Count  207  


 


Seg Neutrophils %  34.1 L  


 


VBG pH   


 


VBG pCO2   


 


VBG HCO3   


 


VBG Base Excess   


 


Sodium   144.3 


 


Potassium   4.1 


 


Chloride   104 


 


Carbon Dioxide   24 


 


Anion Gap   16 


 


BUN   9 


 


Creatinine   1.50 H 


 


Est GFR ( Amer)   > 60 


 


Glucose   129 H 


 


Lactic Acid    2.9 H


 


Calcium   9.0 


 


Total Bilirubin   0.4 


 


AST   127 H 


 


Alkaline Phosphatase   53 


 


Total Protein   9.2 H 


 


Albumin   4.9 


 


TSH   


 


Urine Color   


 


Urine Appearance   


 


Urine pH   


 


Ur Specific Gravity   


 


Urine Protein   


 


Urine Glucose (UA)   


 


Urine Ketones   


 


Urine Blood   


 


Urine RBC (Auto)   














  12/26/19 12/26/19 12/26/19





  20:12 20:12 20:47


 


WBC   


 


RBC   


 


Hgb   


 


Hct   


 


MCV   


 


MCH   


 


MCHC   


 


RDW   


 


Plt Count   


 


Seg Neutrophils %   


 


VBG pH  7.32  


 


VBG pCO2  49.0  


 


VBG HCO3  24.9  


 


VBG Base Excess  -1.7  


 


Sodium   


 


Potassium   


 


Chloride   


 


Carbon Dioxide   


 


Anion Gap   


 


BUN   


 


Creatinine   


 


Est GFR (African Amer)   


 


Glucose   


 


Lactic Acid   


 


Calcium   


 


Total Bilirubin   


 


AST   


 


Alkaline Phosphatase   


 


Total Protein   


 


Albumin   


 


TSH   0.38 L 


 


Urine Color    STRAW


 


Urine Appearance    CLEAR


 


Urine pH    6.0


 


Ur Specific Gravity    1.005


 


Urine Protein    30 H


 


Urine Glucose (UA)    NEGATIVE


 


Urine Ketones    NEGATIVE


 


Urine Blood    SMALL H


 


Urine RBC (Auto)    0








                                        











  12/26/19 12/26/19 12/26/19





  20:12 20:12 20:12


 


Creatine Kinase  705 H  


 


Troponin I   < 0.012 


 


NT-Pro-B Natriuret Pep    < 11











Impressions: 


                                        





KUB X-Ray  12/26/19 00:00


IMPRESSION:


 


Nonspecific bowel gas pattern. Tip of the enteric tube in the


stomach.


 


 


copyright 2011 [employer]- All Rights Reserved


 








Head CT  12/26/19 20:45


IMPRESSION:


 


No acute intracranial abnormality


 


TECHNICAL DOCUMENTATION:


 


Quality ID # 436: Final reports with documentation of one or more


dose reduction techniques (e.g., Automated exposure control,


adjustment of the mA and/or kV according to patient size, use of


iterative reconstruction technique)


 


copyright 2011 [employer] All Rights Reserved


 








Cervical Spine CT  12/26/19 20:46


IMPRESSION: 


 


No significant findings in the cervical spine. Mild degenerative


changes at C5-6.


 


 


 








Abdomen/Pelvis CT  12/26/19 20:48


IMPRESSION:


 


No acute intrathoracic process. No acute intra-abdominal/pelvic


process.


 


TECHNICAL DOCUMENTATION:


 


Quality ID # [id number]: Final reports with documentation of one or more


dose reduction techniques (e.g., Automated exposure control,


adjustment of the mA and/or kV according to patient size, use of


iterative reconstruction technique)


 


copyright 2011 [employer] All Rights Reserved


 








Chest CT  12/26/19 20:48


IMPRESSION:


 


No acute intrathoracic process. No acute intra-abdominal/pelvic


process.


 


TECHNICAL DOCUMENTATION:


 


Quality ID # [id number]: Final reports with documentation of one or more


dose reduction techniques (e.g., Automated exposure control,


adjustment of the mA and/or kV according to patient size, use of


iterative reconstruction technique)


 


copyright 2011 [employer] All Rights Reserved


 














Critical Time


Critical Time (minutes): 60 - not including procedures


-: 


The care of a critically ill patient is dynamic.  This note represents a static 

moment in the admission process. Orders and treatments may be given 

simultaneously and urgently, and time is not representative of the treatment 

process.





This patient requires Critical Care secondary to life threatening organ or limb 

dysfunction.  Without Critical Care services, the patient is at risk for 

increased mortality and morbidity.

## 2019-12-27 NOTE — PSYCHOLOGICAL NOTE
Psych Note





- Psych Note


Date seen by psych provider: 12/27/19


Time seen by psych provider: 15:26 - attempted


Psych Note: 


Reason for Consult: Reported Intentional Overdose





Evaluation was attempted with patient (he was extubated this morning); however, 

was unable to awaken.  Patient had a BAL of 546 at 2012 last night.  IVC 

petition was completed due to concerns the patient intentional overdosed.  This 

will ensure the patient can not sign out AMA before a psychiatric evaluation is 

competed.  Evaluation will be attempted again tomorrow.

## 2019-12-27 NOTE — RADIOLOGY REPORT (SQ)
EXAM DESCRIPTION: 



XR CHEST 1 VIEW



COMPLETED DATE/TME:  12/27/2019 04:00



CLINICAL HISTORY: 



44 years, Male, ETT placement verification



COMPARISON:

12/26/2019 chest



NUMBER OF VIEWS:

1



TECHNIQUE:

Portable chest



LIMITATIONS: None



FINDINGS:



Placement of an enteric tube with the tip in the left upper

quadrant/stomach. Overlying cardiac leads and wires. Endotracheal

tube remains in place. Heart size is grossly stable. No discrete

pneumothorax. Lungs are clear





IMPRESSION:



Placement of an enteric tube. Other findings are grossly stable

 



copyright 2011 [employer]- All Rights Reserved

## 2019-12-27 NOTE — EKG REPORT
SEVERITY:- ABNORMAL ECG -

SINUS TACHYCARDIA

NONSPECIFIC T ABNORMALITIES, INFERIOR LEADS

:

Confirmed by: Frida Underwood MD 27-Dec-2019 19:27:03

## 2019-12-28 LAB
ADD MANUAL DIFF: NO
ANION GAP SERPL CALC-SCNC: 9 MMOL/L (ref 5–19)
BASOPHILS # BLD AUTO: 0 10^3/UL (ref 0–0.2)
BASOPHILS NFR BLD AUTO: 1 % (ref 0–2)
BUN SERPL-MCNC: 12 MG/DL (ref 7–20)
CALCIUM: 8.4 MG/DL (ref 8.4–10.2)
CHLORIDE SERPL-SCNC: 101 MMOL/L (ref 98–107)
CO2 SERPL-SCNC: 28 MMOL/L (ref 22–30)
EOSINOPHIL # BLD AUTO: 0.1 10^3/UL (ref 0–0.6)
EOSINOPHIL NFR BLD AUTO: 2.8 % (ref 0–6)
ERYTHROCYTE [DISTWIDTH] IN BLOOD BY AUTOMATED COUNT: 13.1 % (ref 11.5–14)
GLUCOSE SERPL-MCNC: 101 MG/DL (ref 75–110)
HCT VFR BLD CALC: 34.1 % (ref 37.9–51)
HGB BLD-MCNC: 11.7 G/DL (ref 13.5–17)
LYMPHOCYTES # BLD AUTO: 1.8 10^3/UL (ref 0.5–4.7)
LYMPHOCYTES NFR BLD AUTO: 37.3 % (ref 13–45)
MCH RBC QN AUTO: 30.7 PG (ref 27–33.4)
MCHC RBC AUTO-ENTMCNC: 34.3 G/DL (ref 32–36)
MCV RBC AUTO: 90 FL (ref 80–97)
MONOCYTES # BLD AUTO: 0.7 10^3/UL (ref 0.1–1.4)
MONOCYTES NFR BLD AUTO: 14.5 % (ref 3–13)
NEUTROPHILS # BLD AUTO: 2.1 10^3/UL (ref 1.7–8.2)
NEUTS SEG NFR BLD AUTO: 44.4 % (ref 42–78)
PLATELET # BLD: 145 10^3/UL (ref 150–450)
POTASSIUM SERPL-SCNC: 3.5 MMOL/L (ref 3.6–5)
RBC # BLD AUTO: 3.81 10^6/UL (ref 4.35–5.55)
TOTAL CELLS COUNTED % (AUTO): 100 %
WBC # BLD AUTO: 4.8 10^3/UL (ref 4–10.5)

## 2019-12-28 RX ADMIN — Medication SCH: at 15:17

## 2019-12-28 RX ADMIN — ACETAMINOPHEN PRN MG: 325 TABLET ORAL at 19:33

## 2019-12-28 RX ADMIN — DIVALPROEX SODIUM SCH MG: 500 TABLET, FILM COATED, EXTENDED RELEASE ORAL at 18:22

## 2019-12-28 RX ADMIN — FOLIC ACID SCH MG: 1 TABLET ORAL at 10:03

## 2019-12-28 RX ADMIN — Medication SCH: at 21:36

## 2019-12-28 RX ADMIN — Medication SCH MG: at 10:03

## 2019-12-28 RX ADMIN — CYCLOBENZAPRINE HYDROCHLORIDE SCH MG: 10 TABLET, FILM COATED ORAL at 18:22

## 2019-12-28 RX ADMIN — Medication SCH: at 05:08

## 2019-12-28 RX ADMIN — ATORVASTATIN CALCIUM SCH MG: 40 TABLET, FILM COATED ORAL at 21:37

## 2019-12-28 RX ADMIN — BUSPIRONE HYDROCHLORIDE SCH MG: 10 TABLET ORAL at 18:22

## 2019-12-28 RX ADMIN — HEPARIN SODIUM SCH UNIT: 5000 INJECTION, SOLUTION INTRAVENOUS; SUBCUTANEOUS at 05:08

## 2019-12-28 RX ADMIN — ACETAMINOPHEN PRN MG: 325 TABLET ORAL at 14:16

## 2019-12-28 RX ADMIN — MULTIVITAMIN TABLET SCH TAB: TABLET at 10:03

## 2019-12-28 RX ADMIN — HEPARIN SODIUM SCH: 5000 INJECTION, SOLUTION INTRAVENOUS; SUBCUTANEOUS at 15:17

## 2019-12-28 RX ADMIN — LORAZEPAM PRN MG: 2 INJECTION INTRAMUSCULAR; INTRAVENOUS at 04:08

## 2019-12-28 RX ADMIN — HEPARIN SODIUM SCH: 5000 INJECTION, SOLUTION INTRAVENOUS; SUBCUTANEOUS at 21:35

## 2019-12-28 RX ADMIN — BUSPIRONE HYDROCHLORIDE SCH: 10 TABLET ORAL at 21:35

## 2019-12-28 NOTE — PSYCHOLOGICAL NOTE
Psych Note





- Psych Note


Date seen by psych provider: 19


Time seen by psych provider: 12:30


Psych Note: 





Reason for Consult: Reported Intentional Overdose





Evaluation was attempted with patient. His wife was just walking into visit.  

Patient is currently sleeping.  Patient's wife spoke with clinician.  She 

disclosed the patient is an alcoholic drinking daily.  She continued to report 

that he is diagnosed with PTSD and TBI and drinks to self medicate.  She confi

konrad [facility] however does not 

take the medications prescribed.  She denies the patient engages in therapeutic 

services.  She continued to report that the patient had taken a cab to the 

airport since their baby had fallen asleep.  She disclosed that he had told her 

he remembered taking his headache medication the night before however she denies

he told her he took too many.  She reports that he is never verbally reported 

that he wanted to die.  She disclosed the patient has no memory of what occurred

and has asked her what happened; because he is in so much pain he asked if he 

fell.  She denies knowing any history of the patient engaging in self-harm or 

stating that he wanted to die.





Clinician spoke with patient with wife at bedside per patient's request


Patient states that he does not understand how he was found unresponsive stating

he did not drink enough that he would have passed out.  He continued to report 

that he is diagnosed with severe PTSD and TBI that is combat related.  He 

confirms [facility] and states that he goes monthly to 

his psychiatric appointment.  He states he takes medications as directed.  

Clinician notes patient attempts to state he receives therapy however was 

corrected by his wife.  Patient recently had spoken to his provider about 

getting into therapy; patient has not received therapeutic services since 2017. 

He continued to report that this is the third time this is happened this year 

where he was found unresponsive or unconscious.  He discussed an event that 

occurred previously where he was found at a gas station where he fell asleep.  

He reports that these events are connected to his PTSD and TBI and not to his 

drinking.  He denies any thoughts of wanting to harm himself.  When asked about 

comments he made to staff he reports that he he has no memory of this however if

he did he would have been saying that he took his regular dose of medication.  

Patient confirms he was going to fly to [place] to his aunt's  which is 

today.  Patient reports that he is the primary caregiver of his young son and he

would never do anything to harm himself.





Patient is alert and orientated to person, place, time and circumstance.  Mood 

is irritable with flat affect.  Patient adamantly denies suicidal and homicidal 

ideation.  Delusions are absent behaviors congruent with an intact reality based

presentation ie organized and linear thought process.  Thought content is 

guarded. eye contact is poor.  As patient refuses to make eye contact with 

clinician.  Conversational speech is low and difficult to hear at times. 

intellectual abilities appear to be within the average range.  Attention and 

concentration is fair.  Insight, judgment, impulse control is poor.





Diagnosis


Substance abuse; Alcohol, severe


PTSD (combat related) per history provided by patient and wife


TBI per history provided by patient and wife





Medication recommendations per [facility]'s contracted psychiatrist Dr. Linsey ALCALA 

are as follows


Depakote 500 mg twice daily 


BuSpar 10 mg twice daily





Impression/Plan: Patient is recommended to continued under IVC.  Patient 

presented with a BAL of 546.  Patient reported that he felt he did not drink 

enough to even pass out and attempts to contribute all his symptoms to his PTSD 

or TBI.  Patient demonstrates little understanding of his severe alcohol abuse. 

Patient is noted to be guarded and attempts to provide misinformation on 

compliance of his treatment.  Clinician notes CPS report was submitted with 

concerns of the patient drinks daily and is the primary caregiver of his young 

son.  Medication recommendations have been provided.  Patient will be 

reevaluated.  Dr. Mirza was consulted to care management of this patient; 

attending physicians in agreement with recommendations and disposition.

## 2019-12-29 LAB
ADD MANUAL DIFF: NO
ANION GAP SERPL CALC-SCNC: 10 MMOL/L (ref 5–19)
BASOPHILS # BLD AUTO: 0 10^3/UL (ref 0–0.2)
BASOPHILS NFR BLD AUTO: 1.1 % (ref 0–2)
BUN SERPL-MCNC: 11 MG/DL (ref 7–20)
CALCIUM: 9.3 MG/DL (ref 8.4–10.2)
CHLORIDE SERPL-SCNC: 105 MMOL/L (ref 98–107)
CO2 SERPL-SCNC: 25 MMOL/L (ref 22–30)
EOSINOPHIL # BLD AUTO: 0.2 10^3/UL (ref 0–0.6)
EOSINOPHIL NFR BLD AUTO: 4.3 % (ref 0–6)
ERYTHROCYTE [DISTWIDTH] IN BLOOD BY AUTOMATED COUNT: 12.9 % (ref 11.5–14)
GLUCOSE SERPL-MCNC: 102 MG/DL (ref 75–110)
HCT VFR BLD CALC: 36.1 % (ref 37.9–51)
HGB BLD-MCNC: 12.4 G/DL (ref 13.5–17)
LYMPHOCYTES # BLD AUTO: 1.4 10^3/UL (ref 0.5–4.7)
LYMPHOCYTES NFR BLD AUTO: 34.2 % (ref 13–45)
MCH RBC QN AUTO: 30.8 PG (ref 27–33.4)
MCHC RBC AUTO-ENTMCNC: 34.4 G/DL (ref 32–36)
MCV RBC AUTO: 90 FL (ref 80–97)
MONOCYTES # BLD AUTO: 0.6 10^3/UL (ref 0.1–1.4)
MONOCYTES NFR BLD AUTO: 14.4 % (ref 3–13)
NEUTROPHILS # BLD AUTO: 1.9 10^3/UL (ref 1.7–8.2)
NEUTS SEG NFR BLD AUTO: 46 % (ref 42–78)
PLATELET # BLD: 157 10^3/UL (ref 150–450)
POTASSIUM SERPL-SCNC: 4.1 MMOL/L (ref 3.6–5)
RBC # BLD AUTO: 4.03 10^6/UL (ref 4.35–5.55)
TOTAL CELLS COUNTED % (AUTO): 100 %
WBC # BLD AUTO: 4.1 10^3/UL (ref 4–10.5)

## 2019-12-29 RX ADMIN — LIDOCAINE SCH PATCH: 50 PATCH CUTANEOUS at 13:16

## 2019-12-29 RX ADMIN — DIVALPROEX SODIUM SCH MG: 500 TABLET, FILM COATED, EXTENDED RELEASE ORAL at 13:15

## 2019-12-29 RX ADMIN — Medication SCH: at 13:23

## 2019-12-29 RX ADMIN — Medication SCH: at 05:18

## 2019-12-29 RX ADMIN — BUSPIRONE HYDROCHLORIDE SCH MG: 10 TABLET ORAL at 09:44

## 2019-12-29 RX ADMIN — HEPARIN SODIUM SCH: 5000 INJECTION, SOLUTION INTRAVENOUS; SUBCUTANEOUS at 05:18

## 2019-12-29 RX ADMIN — Medication SCH: at 05:59

## 2019-12-29 RX ADMIN — ATORVASTATIN CALCIUM SCH MG: 40 TABLET, FILM COATED ORAL at 21:19

## 2019-12-29 RX ADMIN — Medication SCH ML: at 13:23

## 2019-12-29 RX ADMIN — CYCLOBENZAPRINE HYDROCHLORIDE SCH MG: 10 TABLET, FILM COATED ORAL at 09:44

## 2019-12-29 RX ADMIN — HEPARIN SODIUM SCH: 5000 INJECTION, SOLUTION INTRAVENOUS; SUBCUTANEOUS at 13:16

## 2019-12-29 RX ADMIN — Medication SCH MG: at 09:44

## 2019-12-29 RX ADMIN — MULTIVITAMIN TABLET SCH TAB: TABLET at 09:44

## 2019-12-29 RX ADMIN — HEPARIN SODIUM SCH UNIT: 5000 INJECTION, SOLUTION INTRAVENOUS; SUBCUTANEOUS at 21:21

## 2019-12-29 RX ADMIN — FOLIC ACID SCH MG: 1 TABLET ORAL at 09:44

## 2019-12-29 RX ADMIN — BUSPIRONE HYDROCHLORIDE SCH MG: 10 TABLET ORAL at 21:20

## 2019-12-29 RX ADMIN — Medication SCH: at 23:15

## 2019-12-29 RX ADMIN — ACETAMINOPHEN PRN MG: 325 TABLET ORAL at 19:46

## 2019-12-29 RX ADMIN — CYCLOBENZAPRINE HYDROCHLORIDE SCH MG: 10 TABLET, FILM COATED ORAL at 17:47

## 2019-12-29 NOTE — PDOC PROGRESS REPORT
Subjective


Progress Note for:: 12/29/19


Subjective:: 


Mr. Alejandra is a 44yr old M with PMH of TBI in 2010 without residual deficits, 

PTSD and heavy ETOH daily use per wife. Per ED reports and information obtained 

from wife, pt was on his way to [place] and was found in the airport 

unresponsive. EMS was called and pt was brought to the ED. He had no response to

narcan trial and decision was made to intubate in the ED for airway protection. 

Head/neck/chest/abd/pelvis CT scan was grossly negative. Wife reports that pt dr

inks vodka and beer daily but states he attempts to hide his habit from her but 

hiding the large Liter vodka bottles around the house; she is unable to estimate

the average amount of alcohol intake daily but states she believes he has been 

drinking since about 2012. He is prescribed medications for PTSD but she states 

he rarely takes these and prefers drinking. She denies pt complaining of pain, f

eeling ill or having symptoms of illness prior to this episode.





12/29/2019.  No acute events overnight.  Patient resting in bed comfortably no 

apparent distress complaining of sore chest otherwise denies any fever, chills, 

nausea, vomiting, shortness of breath, anxiety, hallucination, formication, 

headache, nausea, vomiting, diarrhea, constipation or any urinary symptoms.


Reason For Visit: 


AMS,ETOH INTOXICATION,ACUTE RESPIRATORY FAILURE








Physical Exam


Vital Signs: 


                                        











Temp Pulse Resp BP Pulse Ox


 


 97.4 F   73   20   132/79 H  100 


 


 12/29/19 11:35  12/29/19 11:35  12/29/19 11:35  12/29/19 11:35  12/29/19 11:35








                                 Intake & Output











 12/28/19 12/29/19 12/30/19





 06:59 06:59 06:59


 


Intake Total 1000  


 


Output Total 3245 500 


 


Balance -2245 -500 


 


Weight 110.6 kg 102.3 kg 











General appearance: PRESENT: obese


Head exam: PRESENT: atraumatic, normocephalic


Respiratory exam: PRESENT: clear to auscultation elisa.  ABSENT: rales, rhonchi, 

wheezes


Cardiovascular exam: PRESENT: RRR.  ABSENT: diastolic murmur, rubs, systolic 

murmur


GI/Abdominal exam: PRESENT: normal bowel sounds, soft.  ABSENT: distended, 

guarding, mass, organolmegaly, rebound, tenderness


Neurological exam: PRESENT: alert, awake, oriented to person, oriented to place,

oriented to time, oriented to situation, CN II-XII grossly intact.  ABSENT: 

motor sensory deficit





Results


Laboratory Results: 


                                        





                                 12/29/19 05:15 





                                 12/29/19 05:15 





                                        











  12/29/19 12/29/19





  05:15 05:15


 


WBC  4.1 


 


RBC  4.03 L 


 


Hgb  12.4 L 


 


Hct  36.1 L 


 


MCV  90 


 


MCH  30.8 


 


MCHC  34.4 


 


RDW  12.9 


 


Plt Count  157 


 


Seg Neutrophils %  46.0 


 


Sodium   139.8


 


Potassium   4.1


 


Chloride   105


 


Carbon Dioxide   25


 


Anion Gap   10


 


BUN   11


 


Creatinine   1.32 H


 


Est GFR (African Amer)   > 60


 


Glucose   102


 


Calcium   9.3








                                        











  12/26/19 12/26/19 12/26/19





  20:12 20:12 20:12


 


Creatine Kinase  705 H  


 


Troponin I   < 0.012 


 


NT-Pro-B Natriuret Pep    < 11











Impressions: 


                                        





KUB X-Ray  12/26/19 00:00


IMPRESSION:


 


Nonspecific bowel gas pattern. Tip of the enteric tube in the


stomach.


 


 


copyright 2011 [employer]- All Rights Reserved


 








Head CT  12/26/19 20:45


IMPRESSION:


 


No acute intracranial abnormality


 


TECHNICAL DOCUMENTATION:


 


Quality ID # [id number]: Final reports with documentation of one or more


dose reduction techniques (e.g., Automated exposure control,


adjustment of the mA and/or kV according to patient size, use of


iterative reconstruction technique)


 


copyright 2011 [employer]- All Rights Reserved


 








Cervical Spine CT  12/26/19 20:46


IMPRESSION: 


 


No significant findings in the cervical spine. Mild degenerative


changes at C5-6.


 


 


 








Abdomen/Pelvis CT  12/26/19 20:48


IMPRESSION:


 


No acute intrathoracic process. No acute intra-abdominal/pelvic


process.


 


TECHNICAL DOCUMENTATION:


 


Quality ID # [id number]: Final reports with documentation of one or more


dose reduction techniques (e.g., Automated exposure control,


adjustment of the mA and/or kV according to patient size, use of


iterative reconstruction technique)


 


copyright 2011 [employer]- All Rights Reserved


 








Chest CT  12/26/19 20:48


IMPRESSION:


 


No acute intrathoracic process. No acute intra-abdominal/pelvic


process.


 


TECHNICAL DOCUMENTATION:


 


Quality ID # [id number]: Final reports with documentation of one or more


dose reduction techniques (e.g., Automated exposure control,


adjustment of the mA and/or kV according to patient size, use of


iterative reconstruction technique)


 


copyright 2011 [employer]- All Rights Reserved


 








Chest X-Ray  12/27/19 04:00


IMPRESSION:


 


Placement of an enteric tube. Other findings are grossly stable


 


 


copyright 2011 [employer]- All Rights Reserved


 














Assessment and Plan





- Diagnosis


(1) Alcohol intoxication


Qualifiers: 


   Complication of substance-induced condition: with unspecified complication   

Qualified Code(s): F10.929 - Alcohol use, unspecified with intoxication, 

unspecified   


Is this a current diagnosis for this admission?: Yes   


Plan: 


Due to EtOH abuse complicated by combination of EtOH and benzos.  Alcohol level 

on admission 546. 


Patient initially admitted to ICU where he was intubated.  Extubated on 

12/20/2019.  Also to floor 12/28/2019.  


Patient does not exhibit any signs of withdrawal.  All vitals WNL.  


Alert and oriented x3, cooperative with physical examination.


Monitor for withdrawals, PRN benzodiazepines, folic acid and thiamine.  Monitor 

for seizure and falls.


Patient medically optimized to be transferred to inpatient rehab if needed.








(2) Acute respiratory failure


Qualifiers: 


   Respiratory failure complication: unspecified whether with hypoxia or 

hypercapnia   Qualified Code(s): J96.00 - Acute respiratory failure, unspecified

whether with hypoxia or hypercapnia   


Is this a current diagnosis for this admission?: Yes   


Plan: 


Due to alcohol and benzo intoxication.


Initially admitted to ICU where he was intubated.


Extubated on 12/20/2019.


On room air since being extubated.  Vitals WNL.








(3) Alcohol abuse, daily use


Is this a current diagnosis for this admission?: Yes   


Plan: 


Advised on abstinence.


Plan is to transfer patient to inpatient rehab as per psych recommendation.  P

lease refer to psych note.  








(4) Depression


Qualifiers: 


   Depression Type: unspecified   Qualified Code(s): F32.9 - Major depressive 

disorder, single episode, unspecified   


Is this a current diagnosis for this admission?: Yes   


Plan: 


Denies any suicidal homicidal ideation.


Home meds are duloxetine, amitriptyline.


Restart home meds.


Started on Depakote and was transferred psych recommendation.


Patient plan to transfer to inpatient rehab as per psych recommendation. 


Please refer to note.








(5) PTSD (post-traumatic stress disorder)


Is this a current diagnosis for this admission?: Yes   


Plan: 


Denies any suicidal or homicidal ideation.








(6) TBI (traumatic brain injury)


Qualifiers: 


   Encounter type: sequela 


Is this a current diagnosis for this admission?: Yes   


Plan: 


No focal neurologic deficits.  Alert and oriented 3.








(7) Acute kidney injury superimposed on CKD


Is this a current diagnosis for this admission?: Yes   


Plan: 


History of CKD.  Baseline 1.4-1.5.  Creatinine back to baseline.  Electrolytes 

WNL.


Monitor volume status and electrolytes.  Replace as needed.  Outpatient PCP and 

nephrology follow-up.  Avoid nephrotoxic meds.

## 2019-12-30 LAB
ALBUMIN SERPL-MCNC: 3.9 G/DL (ref 3.5–5)
ALP SERPL-CCNC: 62 U/L (ref 38–126)
ANION GAP SERPL CALC-SCNC: 11 MMOL/L (ref 5–19)
AST SERPL-CCNC: 105 U/L (ref 17–59)
BILIRUB DIRECT SERPL-MCNC: 0.1 MG/DL (ref 0–0.4)
BILIRUB SERPL-MCNC: 0.4 MG/DL (ref 0.2–1.3)
BUN SERPL-MCNC: 13 MG/DL (ref 7–20)
CALCIUM: 9.6 MG/DL (ref 8.4–10.2)
CHLORIDE SERPL-SCNC: 105 MMOL/L (ref 98–107)
CO2 SERPL-SCNC: 23 MMOL/L (ref 22–30)
GLUCOSE SERPL-MCNC: 102 MG/DL (ref 75–110)
POTASSIUM SERPL-SCNC: 4 MMOL/L (ref 3.6–5)
PROT SERPL-MCNC: 7.8 G/DL (ref 6.3–8.2)

## 2019-12-30 RX ADMIN — BUSPIRONE HYDROCHLORIDE SCH MG: 10 TABLET ORAL at 09:52

## 2019-12-30 RX ADMIN — MULTIVITAMIN TABLET SCH TAB: TABLET at 09:49

## 2019-12-30 RX ADMIN — HEPARIN SODIUM SCH UNIT: 5000 INJECTION, SOLUTION INTRAVENOUS; SUBCUTANEOUS at 21:39

## 2019-12-30 RX ADMIN — LIDOCAINE SCH PATCH: 50 PATCH CUTANEOUS at 15:11

## 2019-12-30 RX ADMIN — CYCLOBENZAPRINE HYDROCHLORIDE SCH MG: 10 TABLET, FILM COATED ORAL at 09:50

## 2019-12-30 RX ADMIN — DIVALPROEX SODIUM SCH MG: 500 TABLET, FILM COATED, EXTENDED RELEASE ORAL at 10:00

## 2019-12-30 RX ADMIN — BUSPIRONE HYDROCHLORIDE SCH MG: 10 TABLET ORAL at 21:39

## 2019-12-30 RX ADMIN — Medication SCH: at 21:29

## 2019-12-30 RX ADMIN — HEPARIN SODIUM SCH UNIT: 5000 INJECTION, SOLUTION INTRAVENOUS; SUBCUTANEOUS at 06:30

## 2019-12-30 RX ADMIN — ATORVASTATIN CALCIUM SCH MG: 20 TABLET, FILM COATED ORAL at 21:39

## 2019-12-30 RX ADMIN — CYCLOBENZAPRINE HYDROCHLORIDE SCH MG: 10 TABLET, FILM COATED ORAL at 21:39

## 2019-12-30 RX ADMIN — Medication SCH: at 07:50

## 2019-12-30 RX ADMIN — Medication SCH: at 21:38

## 2019-12-30 RX ADMIN — Medication SCH MG: at 09:49

## 2019-12-30 RX ADMIN — Medication SCH: at 21:28

## 2019-12-30 RX ADMIN — FOLIC ACID SCH MG: 1 TABLET ORAL at 09:50

## 2019-12-30 RX ADMIN — Medication SCH ML: at 06:30

## 2019-12-30 RX ADMIN — HEPARIN SODIUM SCH UNIT: 5000 INJECTION, SOLUTION INTRAVENOUS; SUBCUTANEOUS at 15:10

## 2019-12-30 NOTE — PSYCHOLOGICAL NOTE
Psych Note





- Psych Note


Date seen by psych provider: 12/30/19


Time seen by psych provider: 18:00


Psych Note: 





Reason for Consult: Reported Intentional Overdose





Clinician spoke with patient and his wife. Patient states he had reached out to 

[facility]. Duran, regarding SATP. Patient stated he was 

scared about getting in the plane. Patient's description of fear is suggestive 

of a PTSD episode. Patient requests not to go inpatient as it will place an 

undue burden on his family. Patient has a C&P exam scheduled on 01/08/2019 that 

he cannot miss. Patient expressed concern over wife losing one or both of her 

jobs due to lack of childcare. 





Clinician discussed alcohol to "cope" and "self medicate." Patient was receptive

and verbalized understanding that this situation was serious.  Patient and wife 

state patient has never engaged in alcohol when the child is in his care. Wife 

states patient will drink on her days off. Patient offered little insight into 

his present circumstance. 








[facility]; patient's information has been forwarded to [facility]





Diagnosis


Substance abuse; Alcohol, severe


PTSD (combat related) per history provided by patient and wife


TBI per history provided by patient and wife





Medication recommendations per [facility]'s contracted psychiatrist Dr. Linsey ALCALA 

are as follows


Depakote 500 mg twice daily 


BuSpar 10 mg twice daily





Impression/Plan: Patient is recommended to continued under IVC.  Patient 

presented with a BAL of 546.  Patient reported that he felt he did not drink 

enough to even pass out and attempts to contribute all his symptoms to his PTSD 

or TBI.  Patient verbalized some insight of his severe alcohol abuse, however 

the insight could be linked to his desire not to go inpatient. Clinician notes 

CPS report was submitted with concerns of the patient drinks daily and is the 

primary caregiver of his young son.  Medication recommendations have been 

provided.  Patient will be reevaluated. Dr. Mirza was consulted to care 

management of this patient; attending physicians in agreement with 

recommendations and disposition.

## 2019-12-30 NOTE — PDOC PROGRESS REPORT
Subjective


Progress Note for:: 12/30/19


Subjective:: 


Mr. Alejandra is a 44yr old M with PMH of TBI in 2010 without residual deficits, 

PTSD and heavy ETOH daily use per wife. Per ED reports and information obtained 

from wife, pt was on his way to [place] and was found in the airport 

unresponsive. EMS was called and pt was brought to the ED. He had no response to

narcan trial and decision was made to intubate in the ED for airway protection. 

Head/neck/chest/abd/pelvis CT scan was grossly negative. Wife reports that pt dr

inks vodka and beer daily but states he attempts to hide his habit from her but 

hiding the large Liter vodka bottles around the house; she is unable to estimate

the average amount of alcohol intake daily but states she believes he has been 

drinking since about 2012. He is prescribed medications for PTSD but she states 

he rarely takes these and prefers drinking. She denies pt complaining of pain, f

eeling ill or having symptoms of illness prior to this episode.





12/29/2019.  No acute events overnight.  Patient resting in bed comfortably no 

apparent distress complaining of sore chest otherwise denies any fever, chills, 

nausea, vomiting, shortness of breath, anxiety, hallucination, formication, 

headache, nausea, vomiting, diarrhea, constipation or any urinary symptoms.





12/30/2019.  No acute events overnight.  Patient alert at this time 3 

cooperative with physical examination very pleasant.  Anxious to be discharged 

today can go home.  Unfortunate patient in IVC and pending transfer to inpatient

psych.


Reason For Visit: 


AMS,ETOH INTOXICATION,ACUTE RESPIRATORY FAILURE








Physical Exam


Vital Signs: 


                                        











Temp Pulse Resp BP Pulse Ox


 


 98.2 F   73   18   132/72 H  100 


 


 12/30/19 11:51  12/30/19 11:51  12/30/19 11:51  12/30/19 11:51  12/30/19 11:51








                                 Intake & Output











 12/29/19 12/30/19 12/31/19





 06:59 06:59 06:59


 


Intake Total  1220 480


 


Output Total 500 340 600


 


Balance -500 880 -120


 


Weight 102.3 kg 102.9 kg 











General appearance: PRESENT: no acute distress, well-developed, well-nourished


Head exam: PRESENT: atraumatic, normocephalic


Respiratory exam: PRESENT: clear to auscultation elisa.  ABSENT: rales, rhonchi, 

wheezes


Cardiovascular exam: PRESENT: RRR.  ABSENT: diastolic murmur, rubs, systolic 

murmur


GI/Abdominal exam: PRESENT: normal bowel sounds, soft.  ABSENT: distended, 

guarding, mass, organolmegaly, rebound, tenderness


Neurological exam: PRESENT: alert, awake, oriented to person, oriented to place,

oriented to time, oriented to situation, CN II-XII grossly intact.  ABSENT: 

motor sensory deficit





Results


Laboratory Results: 


                                        





                                 12/29/19 05:15 





                                 12/30/19 05:55 





                                        











  12/30/19





  05:55


 


Sodium  138.5


 


Potassium  4.0


 


Chloride  105


 


Carbon Dioxide  23


 


Anion Gap  11


 


BUN  13


 


Creatinine  1.34 H


 


Est GFR (African Amer)  > 60


 


Glucose  102


 


Calcium  9.6


 


Total Bilirubin  0.4


 


AST  105 H


 


Alkaline Phosphatase  62


 


Total Protein  7.8


 


Albumin  3.9








                                        











  12/26/19 12/26/19 12/26/19





  20:12 20:12 20:12


 


Creatine Kinase  705 H  


 


Troponin I   < 0.012 


 


NT-Pro-B Natriuret Pep    < 11











Impressions: 


                                        





KUB X-Ray  12/26/19 00:00


IMPRESSION:


 


Nonspecific bowel gas pattern. Tip of the enteric tube in the


stomach.


 


 


copyright 2011 [employer]- All Rights Reserved


 








Head CT  12/26/19 20:45


IMPRESSION:


 


No acute intracranial abnormality


 


TECHNICAL DOCUMENTATION:


 


Quality ID # [id number]: Final reports with documentation of one or more


dose reduction techniques (e.g., Automated exposure control,


adjustment of the mA and/or kV according to patient size, use of


iterative reconstruction technique)


 


copyright 2011 [employer]- All Rights Reserved


 








Cervical Spine CT  12/26/19 20:46


IMPRESSION: 


 


No significant findings in the cervical spine. Mild degenerative


changes at C5-6.


 


 


 








Abdomen/Pelvis CT  12/26/19 20:48


IMPRESSION:


 


No acute intrathoracic process. No acute intra-abdominal/pelvic


process.


 


TECHNICAL DOCUMENTATION:


 


Quality ID # [id number]: Final reports with documentation of one or more


dose reduction techniques (e.g., Automated exposure control,


adjustment of the mA and/or kV according to patient size, use of


iterative reconstruction technique)


 


copyright 2011 [employer]- All Rights Reserved


 








Chest CT  12/26/19 20:48


IMPRESSION:


 


No acute intrathoracic process. No acute intra-abdominal/pelvic


process.


 


TECHNICAL DOCUMENTATION:


 


Quality ID # [id number]: Final reports with documentation of one or more


dose reduction techniques (e.g., Automated exposure control,


adjustment of the mA and/or kV according to patient size, use of


iterative reconstruction technique)


 


copyright 2011 [employer]- All Rights Reserved


 








Chest X-Ray  12/27/19 04:00


IMPRESSION:


 


Placement of an enteric tube. Other findings are grossly stable


 


 


copyright 2011 [employer]- All Rights Reserved


 














Assessment and Plan





- Diagnosis


(1) Alcohol intoxication


Qualifiers: 


   Complication of substance-induced condition: with unspecified complication   

Qualified Code(s): F10.929 - Alcohol use, unspecified with intoxication, 

unspecified   


Is this a current diagnosis for this admission?: Yes   


Plan: 


Alert and oriented x3.  Denies any visual or auditory hallucinations.  Vitals 

are WNL.  


Due to EtOH abuse complicated by combination of EtOH and benzos.  Alcohol level 

on admission 546. 


Patient initially admitted to ICU where he was intubated.  Extubated on 

12/20/2019.  Also to floor 12/28/2019.  


Monitor for withdrawals, PRN benzodiazepines, folic acid and thiamine.  Monitor 

for seizure and falls.


Patient medically optimized to be transferred to inpatient rehab if needed.








(2) Acute respiratory failure


Qualifiers: 


   Respiratory failure complication: unspecified whether with hypoxia or 

hypercapnia   Qualified Code(s): J96.00 - Acute respiratory failure, unspecified

whether with hypoxia or hypercapnia   


Is this a current diagnosis for this admission?: Yes   


Plan: 


Resolved.  SPO2 WNL on RA.


Due to alcohol and benzo intoxication.


Initially admitted to ICU where he was intubated.


Extubated on 12/20/2019.








(3) Alcohol abuse, daily use


Is this a current diagnosis for this admission?: Yes   


Plan: 


Advised on abstinence.


Plan is to transfer patient to inpatient rehab as per psych recommendation.  

Please refer to psych note.  








(4) Depression


Qualifiers: 


   Depression Type: unspecified   Qualified Code(s): F32.9 - Major depressive 

disorder, single episode, unspecified   


Is this a current diagnosis for this admission?: Yes   


Plan: 


Denies any suicidal homicidal ideation.


Home meds are duloxetine, amitriptyline.


Restart home meds.


Started on Depakote and was transferred psych recommendation.


Patient plan to transfer to inpatient rehab as per psych recommendation. 


Please refer to note.








(5) PTSD (post-traumatic stress disorder)


Is this a current diagnosis for this admission?: Yes   


Plan: 


Denies any suicidal or homicidal ideation.








(6) TBI (traumatic brain injury)


Qualifiers: 


   Encounter type: sequela 


Is this a current diagnosis for this admission?: Yes   


Plan: 


No focal neurologic deficits.  Alert and oriented 3.








(7) Acute kidney injury superimposed on CKD


Is this a current diagnosis for this admission?: Yes   


Plan: 


Kidney function back to baseline.  Electrolytes WNL.


History of CKD.  Baseline 1.4-1.5.  


Monitor volume status and electrolytes.  Replace as needed.  Outpatient PCP and 

nephrology follow-up.  Avoid nephrotoxic meds.

## 2019-12-30 NOTE — PSYCHOLOGICAL NOTE
Psych Note





- Psych Note


Date seen by psych provider: 12/29/19


Time seen by psych provider: 14:00 - 1445


Psych Note: 





Reason for Consult: Reported Intentional Overdose





Clinician spent 45 minutes with patient with wife at bedside per patient's 

request.  Clinician discussed patient's plan of care and questions the family 

and patient had.  








All 4 [facility]'s are full; patient's information has been forwarded to [place]





Diagnosis


Substance abuse; Alcohol, severe


PTSD (combat related) per history provided by patient and wife


TBI per history provided by patient and wife





Medication recommendations per [facility]'s contracted psychiatrist Dr. Linsey ALCALA 

are as follows


Depakote 500 mg twice daily 


BuSpar 10 mg twice daily





Impression/Plan: Patient is recommended to continued under IVC.  Patient 

presented with a BAL of 546.  Patient reported that he felt he did not drink 

enough to even pass out and attempts to contribute all his symptoms to his PTSD 

or TBI.  Patient demonstrates little understanding of his severe alcohol abuse. 

Patient is noted to be guarded and attempts to provide misinformation on 

compliance of his treatment.  Clinician notes CPS report was submitted with 

concerns of the patient drinks daily and is the primary caregiver of his young 

son.  Medication recommendations have been provided.  Patient will be 

reevaluated. Dr. Mirza was consulted to care management of this patient; 

attending physicians in agreement with recommendations and disposition.

## 2019-12-31 RX ADMIN — DIVALPROEX SODIUM SCH MG: 500 TABLET, FILM COATED, EXTENDED RELEASE ORAL at 10:26

## 2019-12-31 RX ADMIN — LIDOCAINE SCH PATCH: 50 PATCH CUTANEOUS at 09:42

## 2019-12-31 RX ADMIN — CYCLOBENZAPRINE HYDROCHLORIDE SCH MG: 10 TABLET, FILM COATED ORAL at 09:41

## 2019-12-31 RX ADMIN — Medication SCH MG: at 09:41

## 2019-12-31 RX ADMIN — Medication SCH: at 18:33

## 2019-12-31 RX ADMIN — Medication SCH ML: at 13:44

## 2019-12-31 RX ADMIN — MULTIVITAMIN TABLET SCH TAB: TABLET at 09:41

## 2019-12-31 RX ADMIN — FOLIC ACID SCH MG: 1 TABLET ORAL at 09:41

## 2019-12-31 RX ADMIN — CYCLOBENZAPRINE HYDROCHLORIDE SCH MG: 10 TABLET, FILM COATED ORAL at 18:35

## 2019-12-31 RX ADMIN — ATORVASTATIN CALCIUM SCH MG: 20 TABLET, FILM COATED ORAL at 23:17

## 2019-12-31 RX ADMIN — BUSPIRONE HYDROCHLORIDE SCH MG: 10 TABLET ORAL at 23:17

## 2019-12-31 RX ADMIN — HEPARIN SODIUM SCH UNIT: 5000 INJECTION, SOLUTION INTRAVENOUS; SUBCUTANEOUS at 13:52

## 2019-12-31 RX ADMIN — HEPARIN SODIUM SCH UNIT: 5000 INJECTION, SOLUTION INTRAVENOUS; SUBCUTANEOUS at 23:16

## 2019-12-31 RX ADMIN — BUSPIRONE HYDROCHLORIDE SCH MG: 10 TABLET ORAL at 09:41

## 2019-12-31 RX ADMIN — Medication SCH ML: at 05:21

## 2019-12-31 RX ADMIN — HEPARIN SODIUM SCH UNIT: 5000 INJECTION, SOLUTION INTRAVENOUS; SUBCUTANEOUS at 05:21

## 2019-12-31 RX ADMIN — Medication SCH: at 05:21

## 2019-12-31 NOTE — PDOC CONSULTATION
Consultation-Blank


Consultation: 





Talked with both patient and his wife regarding the reason for initial and 

continued involuntary commitment. Discussed that based on patient's own 

admission of two previous periods of unconsciousness earlier in the year 

(whether alcohol related or not...and it is unclear at this point), BAL of 549 

at admission, minimization of drinking and need for treatment, poor insight and 

judgment regarding treatment of PTSD / TBI and substance use and its negative 

impact, and his history and ongoing medication non-compliance, in addition to 

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 

for inpatient care and treatment was required to keep him and his young child 

safe. This Clinician discussed the data points of reports, labs, and other 

objective findings as well as clinical research and experience and that to move 

forward with a quality of life the patient is likely to improve with a 

structured treatment program that will provide a different way of thinking and 

more adaptive, healthier coping skills. It is understood that patient does not 

want inpatient care for treatment of alcohol abuse or for his PTSD/TBI, however,

he is in need of such care to ensure his safety and the safety of his small 

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 

at work. 





Patient was guarded and adamant he was able to function at a high level since he

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 

was attending "therapy" but when reminded he had not attended for at least two 

years he reported he was going to begin this year. He reported he was going to 

see if psychiatrist regularly but when asked what the point to going was since 

he did not take his medications as prescribed, the patient could not answer.





While reviewing the patient chart a note from discharge planning dated today 

[facility]

since he was considered "too medically advanced" for their setting. The note 

indicated placement should be sought at other inpatient psychiatric facilities. 





Given the above information, coordination with the attending physician will be 

needed to determine the exact nature of the medical disposition and / or problem

before sending the psychiatric placement packets out to other facilities. 





Medication recommendation from consulting psychiatric provider:


1. discontinue ativan





Impression / Plan: Patient to continue on IVC. Coordination with attending 

physician to determine exact medical disposition. Re-send behavioral health 

packets to psych facilities once determined patient is medically cleared. 

Continie to work with discharge planning regarding the ongoing status and 

discharge of this patient.





Please contact the behavioral health office for questions regarding the IVC or 

regarding this patient.

## 2019-12-31 NOTE — PDOC PROGRESS REPORT
Subjective


Progress Note for:: 12/31/19


Subjective:: 


Mr. Alejandra is a 44yr old M with PMH of TBI in 2010 without residual deficits, 

PTSD and heavy ETOH daily use per wife. Per ED reports and information obtained 

from wife, pt was on his way to [place] and was found in the airport 

unresponsive. EMS was called and pt was brought to the ED. He had no response to

narcan trial and decision was made to intubate in the ED for airway protection. 

Head/neck/chest/abd/pelvis CT scan was grossly negative. Wife reports that pt dr

inks vodka and beer daily but states he attempts to hide his habit from her but 

hiding the large Liter vodka bottles around the house; she is unable to estimate

the average amount of alcohol intake daily but states she believes he has been 

drinking since about 2012. He is prescribed medications for PTSD but she states 

he rarely takes these and prefers drinking. She denies pt complaining of pain, f

eeling ill or having symptoms of illness prior to this episode.





12/29/2019.  No acute events overnight.  Patient resting in bed comfortably no 

apparent distress complaining of sore chest otherwise denies any fever, chills, 

nausea, vomiting, shortness of breath, anxiety, hallucination, formication, 

headache, nausea, vomiting, diarrhea, constipation or any urinary symptoms.





12/30/2019.  No acute events overnight.  Patient alert at this time 3 

cooperative with physical examination very pleasant.  Anxious to be discharged 

today can go home.  Unfortunate patient in IVC and pending transfer to inpatient

psych.





12/31/2019.  No acute events overnight.  Saw patient this afternoon, accompanied

by his wife, in no apparent distress, patient very frustrated about his IVC 

status and very anxious to be released soon.


Reason For Visit: 


AMS,ETOH INTOXICATION,ACUTE RESPIRATORY FAILURE








Physical Exam


Vital Signs: 


                                        











Temp Pulse Resp BP Pulse Ox


 


 97.8 F   87   17   113/76   96 


 


 12/31/19 11:56  12/31/19 11:56  12/31/19 11:56  12/31/19 11:56  12/31/19 11:56








                                 Intake & Output











 12/30/19 12/31/19 01/01/20





 06:59 06:59 06:59


 


Intake Total 1220 600 620


 


Output Total 340 600 


 


Balance 880 0 620


 


Weight 102.9 kg 105.7 kg 











General appearance: PRESENT: no acute distress, well-developed, well-nourished


Head exam: PRESENT: atraumatic, normocephalic


Respiratory exam: PRESENT: clear to auscultation elisa.  ABSENT: rales, rhonchi, 

wheezes


Cardiovascular exam: PRESENT: RRR.  ABSENT: diastolic murmur, rubs, systolic 

murmur


Neurological exam: PRESENT: alert, awake, oriented to person, oriented to place,

oriented to time, oriented to situation, CN II-XII grossly intact.  ABSENT: 

motor sensory deficit





Results


Laboratory Results: 


                                        





                                 12/29/19 05:15 





                                 12/30/19 05:55 





                                        











  12/26/19 12/26/19 12/26/19





  20:12 20:12 20:12


 


Creatine Kinase  705 H  


 


Troponin I   < 0.012 


 


NT-Pro-B Natriuret Pep    < 11











Impressions: 


                                        





KUB X-Ray  12/26/19 00:00


IMPRESSION:


 


Nonspecific bowel gas pattern. Tip of the enteric tube in the


stomach.


 


 


copyright 2011 [employer]- All Rights Reserved


 








Head CT  12/26/19 20:45


IMPRESSION:


 


No acute intracranial abnormality


 


TECHNICAL DOCUMENTATION:


 


Quality ID # 436: Final reports with documentation of one or more


dose reduction techniques (e.g., Automated exposure control,


adjustment of the mA and/or kV according to patient size, use of


iterative reconstruction technique)


 


copyright 2011 [employer] All Rights Reserved


 








Cervical Spine CT  12/26/19 20:46


IMPRESSION: 


 


No significant findings in the cervical spine. Mild degenerative


changes at C5-6.


 


 


 








Abdomen/Pelvis CT  12/26/19 20:48


IMPRESSION:


 


No acute intrathoracic process. No acute intra-abdominal/pelvic


process.


 


TECHNICAL DOCUMENTATION:


 


Quality ID # 436: Final reports with documentation of one or more


dose reduction techniques (e.g., Automated exposure control,


adjustment of the mA and/or kV according to patient size, use of


iterative reconstruction technique)


 


copyright 2011 [employer] All Rights Reserved


 








Chest CT  12/26/19 20:48


IMPRESSION:


 


No acute intrathoracic process. No acute intra-abdominal/pelvic


process.


 


TECHNICAL DOCUMENTATION:


 


Quality ID # 436: Final reports with documentation of one or more


dose reduction techniques (e.g., Automated exposure control,


adjustment of the mA and/or kV according to patient size, use of


iterative reconstruction technique)


 


copyright 2011 [employer]- All Rights Reserved


 








Chest X-Ray  12/27/19 04:00


IMPRESSION:


 


Placement of an enteric tube. Other findings are grossly stable


 


 


copyright 2011 [employer]- All Rights Reserved


 














Assessment and Plan





- Diagnosis


(1) Alcohol intoxication


Qualifiers: 


   Complication of substance-induced condition: with unspecified complication   

Qualified Code(s): F10.929 - Alcohol use, unspecified with intoxication, 

unspecified   


Is this a current diagnosis for this admission?: Yes   


Plan: 


Alert and oriented x3.  Denies any visual or auditory hallucinations.  Vitals 

are WNL.  


Due to EtOH abuse complicated by combination of EtOH and benzos.  Alcohol level 

on admission 546. 


Patient initially admitted to ICU where he was intubated.  Extubated on 

12/20/2019.  Also to floor 12/28/2019.  


Monitor for withdrawals, PRN benzodiazepines, folic acid and thiamine.  Monitor 

for seizure and falls.


Patient medically optimized to be transferred to inpatient rehab if needed.








(2) Acute respiratory failure


Qualifiers: 


   Respiratory failure complication: unspecified whether with hypoxia or 

hypercapnia   Qualified Code(s): J96.00 - Acute respiratory failure, unspecified

whether with hypoxia or hypercapnia   


Is this a current diagnosis for this admission?: Yes   


Plan: 


Resolved.  SPO2 WNL on RA.


Due to alcohol and benzo intoxication.


Initially admitted to ICU where he was intubated.


Extubated on 12/20/2019.








(3) Alcohol abuse, daily use


Is this a current diagnosis for this admission?: Yes   


Plan: 


Advised on abstinence.


Plan is to transfer patient to inpatient rehab as per psych recommendation.  

Please refer to psych note.  








(4) Depression


Qualifiers: 


   Depression Type: unspecified   Qualified Code(s): F32.9 - Major depressive 

disorder, single episode, unspecified   


Is this a current diagnosis for this admission?: Yes   


Plan: 


Denies any suicidal homicidal ideation.


Home meds are duloxetine, amitriptyline.


Restart home meds.


Started on Depakote and was transferred psych recommendation.


Patient plan to transfer to inpatient rehab as per psych recommendation. 


Please refer to note.








(5) PTSD (post-traumatic stress disorder)


Is this a current diagnosis for this admission?: Yes   


Plan: 


Denies any suicidal or homicidal ideation.








(6) TBI (traumatic brain injury)


Qualifiers: 


   Encounter type: sequela 


Is this a current diagnosis for this admission?: Yes   


Plan: 


No focal neurologic deficits.  Alert and oriented 3.








(7) Acute kidney injury superimposed on CKD


Is this a current diagnosis for this admission?: Yes   


Plan: 


Kidney function back to baseline.  Electrolytes WNL.


History of CKD.  Baseline 1.4-1.5.  


Monitor volume status and electrolytes.  Replace as needed.  Outpatient PCP and 

nephrology follow-up.  Avoid nephrotoxic meds.

## 2020-01-01 LAB
ANION GAP SERPL CALC-SCNC: 11 MMOL/L (ref 5–19)
BUN SERPL-MCNC: 12 MG/DL (ref 7–20)
CALCIUM: 9.4 MG/DL (ref 8.4–10.2)
CHLORIDE SERPL-SCNC: 103 MMOL/L (ref 98–107)
CO2 SERPL-SCNC: 25 MMOL/L (ref 22–30)
GLUCOSE SERPL-MCNC: 112 MG/DL (ref 75–110)
POTASSIUM SERPL-SCNC: 3.6 MMOL/L (ref 3.6–5)

## 2020-01-01 RX ADMIN — Medication SCH: at 06:16

## 2020-01-01 RX ADMIN — LIDOCAINE SCH PATCH: 50 PATCH CUTANEOUS at 09:52

## 2020-01-01 RX ADMIN — Medication SCH ML: at 14:53

## 2020-01-01 RX ADMIN — Medication SCH: at 01:04

## 2020-01-01 RX ADMIN — FOLIC ACID SCH MG: 1 TABLET ORAL at 09:49

## 2020-01-01 RX ADMIN — HEPARIN SODIUM SCH UNIT: 5000 INJECTION, SOLUTION INTRAVENOUS; SUBCUTANEOUS at 06:15

## 2020-01-01 RX ADMIN — ATORVASTATIN CALCIUM SCH MG: 20 TABLET, FILM COATED ORAL at 21:07

## 2020-01-01 RX ADMIN — Medication SCH ML: at 06:16

## 2020-01-01 RX ADMIN — BUSPIRONE HYDROCHLORIDE SCH MG: 10 TABLET ORAL at 21:07

## 2020-01-01 RX ADMIN — HEPARIN SODIUM SCH UNIT: 5000 INJECTION, SOLUTION INTRAVENOUS; SUBCUTANEOUS at 21:10

## 2020-01-01 RX ADMIN — MULTIVITAMIN TABLET SCH TAB: TABLET at 09:49

## 2020-01-01 RX ADMIN — CYCLOBENZAPRINE HYDROCHLORIDE SCH MG: 10 TABLET, FILM COATED ORAL at 09:49

## 2020-01-01 RX ADMIN — BUSPIRONE HYDROCHLORIDE SCH MG: 10 TABLET ORAL at 09:49

## 2020-01-01 RX ADMIN — Medication SCH: at 14:34

## 2020-01-01 RX ADMIN — CYCLOBENZAPRINE HYDROCHLORIDE SCH MG: 10 TABLET, FILM COATED ORAL at 17:32

## 2020-01-01 RX ADMIN — DIVALPROEX SODIUM SCH MG: 500 TABLET, FILM COATED, EXTENDED RELEASE ORAL at 11:24

## 2020-01-01 RX ADMIN — Medication SCH MG: at 09:49

## 2020-01-01 RX ADMIN — HEPARIN SODIUM SCH UNIT: 5000 INJECTION, SOLUTION INTRAVENOUS; SUBCUTANEOUS at 14:53

## 2020-01-01 NOTE — PDOC PROGRESS REPORT
Subjective


Progress Note for:: 01/01/20


Subjective:: 


Mr. Alejandra is a 44yr old M with PMH of TBI in 2010 without residual deficits, 

PTSD and heavy ETOH daily use per wife. Per ED reports and information obtained 

from wife, pt was on his way to [place] and was found in the airport 

unresponsive. EMS was called and pt was brought to the ED. He had no response to

narcan trial and decision was made to intubate in the ED for airway protection. 

Head/neck/chest/abd/pelvis CT scan was grossly negative. Wife reports that pt dr

inks vodka and beer daily but states he attempts to hide his habit from her but 

hiding the large Liter vodka bottles around the house; she is unable to estimate

the average amount of alcohol intake daily but states she believes he has been 

drinking since about 2012. He is prescribed medications for PTSD but she states 

he rarely takes these and prefers drinking. She denies pt complaining of pain, f

eeling ill or having symptoms of illness prior to this episode.





12/29/2019.  No acute events overnight.  Patient resting in bed comfortably no 

apparent distress complaining of sore chest otherwise denies any fever, chills, 

nausea, vomiting, shortness of breath, anxiety, hallucination, formication, 

headache, nausea, vomiting, diarrhea, constipation or any urinary symptoms.





12/30/2019.  No acute events overnight.  Patient alert at this time 3 

cooperative with physical examination very pleasant.  Anxious to be discharged 

today can go home.  Unfortunate patient in IVC and pending transfer to inpatient

psych.





12/31/2019.  No acute events overnight.  Saw patient this afternoon, accompanied

by his wife, in no apparent distress, patient very frustrated about his IVC 

status and very anxious to be released soon.





1/1/2020.  No acute events overnight.  Saw patient this morning.  Comfortable 

resting in bed.  Denies any fever, chills, nausea, vomiting, diarrhea, 

constipation or any urinary symptoms.  P.o. tolerant, having normal bowel and 

bladder movement.  Very anxious to be discharged.


Reason For Visit: 


AMS,ETOH INTOXICATION,ACUTE RESPIRATORY FAILURE








Physical Exam


Vital Signs: 


                                        











Temp Pulse Resp BP Pulse Ox


 


 98.5 F   89   18   109/60   99 


 


 01/01/20 12:03  01/01/20 12:03  01/01/20 12:03  01/01/20 12:03  01/01/20 12:03








                                 Intake & Output











 12/31/19 01/01/20 01/02/20





 06:59 06:59 06:59


 


Intake Total 600 1100 


 


Output Total 600  


 


Balance 0 1100 


 


Weight 105.7 kg 106.1 kg 











General appearance: PRESENT: no acute distress, well-developed, well-nourished


Head exam: PRESENT: atraumatic, normocephalic


Respiratory exam: PRESENT: clear to auscultation elisa.  ABSENT: rales, rhonchi, 

wheezes


Cardiovascular exam: PRESENT: RRR.  ABSENT: diastolic murmur, rubs, systolic 

murmur


GI/Abdominal exam: PRESENT: normal bowel sounds, soft.  ABSENT: distended, 

guarding, mass, organolmegaly, rebound, tenderness


Neurological exam: PRESENT: alert, awake, oriented to person, oriented to place,

oriented to time, oriented to situation, CN II-XII grossly intact.  ABSENT: 

motor sensory deficit





Results


Laboratory Results: 


                                        





                                 12/29/19 05:15 





                                 01/01/20 10:56 





                                        











  01/01/20





  10:56


 


Sodium  138.6


 


Potassium  3.6


 


Chloride  103


 


Carbon Dioxide  25


 


Anion Gap  11


 


BUN  12


 


Creatinine  1.40 H


 


Est GFR (African Amer)  > 60


 


Glucose  112 H


 


Calcium  9.4








                                        





12/26/19 20:47   Blood   Blood Culture - Final


                            NO GROWTH IN 5 DAYS


12/26/19 20:30   Blood   Blood Culture - Final


                            NO GROWTH IN 5 DAYS





                                        











  12/26/19 12/26/19 12/26/19





  20:12 20:12 20:12


 


Creatine Kinase  705 H  


 


Troponin I   < 0.012 


 


NT-Pro-B Natriuret Pep    < 11











Impressions: 


                                        





KUB X-Ray  12/26/19 00:00


IMPRESSION:


 


Nonspecific bowel gas pattern. Tip of the enteric tube in the


stomach.


 


 


copyright 2011 [employer]- All Rights Reserved


 








Head CT  12/26/19 20:45


IMPRESSION:


 


No acute intracranial abnormality


 


TECHNICAL DOCUMENTATION:


 


Quality ID # 436: Final reports with documentation of one or more


dose reduction techniques (e.g., Automated exposure control,


adjustment of the mA and/or kV according to patient size, use of


iterative reconstruction technique)


 


copyright 2011 [employer]- All Rights Reserved


 








Cervical Spine CT  12/26/19 20:46


IMPRESSION: 


 


No significant findings in the cervical spine. Mild degenerative


changes at C5-6.


 


 


 








Abdomen/Pelvis CT  12/26/19 20:48


IMPRESSION:


 


No acute intrathoracic process. No acute intra-abdominal/pelvic


process.


 


TECHNICAL DOCUMENTATION:


 


Quality ID # [id number]: Final reports with documentation of one or more


dose reduction techniques (e.g., Automated exposure control,


adjustment of the mA and/or kV according to patient size, use of


iterative reconstruction technique)


 


copyright 2011 [employer]- All Rights Reserved


 








Chest CT  12/26/19 20:48


IMPRESSION:


 


No acute intrathoracic process. No acute intra-abdominal/pelvic


process.


 


TECHNICAL DOCUMENTATION:


 


Quality ID # [id number]: Final reports with documentation of one or more


dose reduction techniques (e.g., Automated exposure control,


adjustment of the mA and/or kV according to patient size, use of


iterative reconstruction technique)


 


copyright 2011 [employer]- All Rights Reserved


 








Chest X-Ray  12/27/19 04:00


IMPRESSION:


 


Placement of an enteric tube. Other findings are grossly stable


 


 


copyright 2011 [employer]- All Rights Reserved


 














Assessment and Plan





- Diagnosis


(1) Alcohol intoxication


Qualifiers: 


   Complication of substance-induced condition: with unspecified complication   

Qualified Code(s): F10.929 - Alcohol use, unspecified with intoxication, 

unspecified   


Is this a current diagnosis for this admission?: Yes   


Plan: 


Alert and oriented x3.  Denies any visual or auditory hallucinations.  Vitals 

are WNL.  


Due to EtOH abuse complicated by combination of EtOH and benzos.  Alcohol level 

on admission 546. 


Patient initially admitted to ICU where he was intubated.  Extubated on 

12/20/2019.  Also to floor 12/28/2019.  


Monitor for withdrawals, PRN benzodiazepines, folic acid and thiamine.  Monitor 

for seizure and falls.


Patient medically optimized to be transferred to inpatient rehab if needed.








(2) Acute respiratory failure


Qualifiers: 


   Respiratory failure complication: unspecified whether with hypoxia or 

hypercapnia   Qualified Code(s): J96.00 - Acute respiratory failure, unspecified

whether with hypoxia or hypercapnia   


Is this a current diagnosis for this admission?: Yes   


Plan: 


Resolved.  SPO2 WNL on RA.


Due to alcohol and benzo intoxication.


Initially admitted to ICU where he was intubated.


Extubated on 12/20/2019.








(3) Alcohol abuse, daily use


Is this a current diagnosis for this admission?: Yes   


Plan: 


Advised on abstinence.


Plan is to transfer patient to inpatient rehab as per psych recommendation.  

Please refer to psych note.  








(4) Depression


Qualifiers: 


   Depression Type: unspecified   Qualified Code(s): F32.9 - Major depressive 

disorder, single episode, unspecified   


Is this a current diagnosis for this admission?: Yes   


Plan: 


Denies any suicidal homicidal ideation.


Home meds are duloxetine, amitriptyline.


Restart home meds.


Started on Depakote and was transferred psych recommendation.


Patient plan to transfer to inpatient rehab as per psych recommendation. 


Please refer to note.








(5) PTSD (post-traumatic stress disorder)


Is this a current diagnosis for this admission?: Yes   


Plan: 


Denies any suicidal or homicidal ideation.








(6) TBI (traumatic brain injury)


Qualifiers: 


   Encounter type: sequela 


Is this a current diagnosis for this admission?: Yes   


Plan: 


No focal neurologic deficits.  Alert and oriented 3.








(7) Acute kidney injury superimposed on CKD


Is this a current diagnosis for this admission?: Yes   


Plan: 


Resolved.  Back to baseline.  History of CKD.  





Patient is making adequate amount of urine.  All electrolytes are WNL.  Patient 

is not volume overloaded.  Denies any uremic symptoms.  





Patient has history of CKD and as per chart review patient's creatinine was 1.39

on 8/22/2011 and has been stable since then.





Monitor volume status and electrolytes.  Replace as needed.  Outpatient PCP and 

nephrology follow-up.  Avoid nephrotoxic meds.

## 2020-01-02 RX ADMIN — CYCLOBENZAPRINE HYDROCHLORIDE SCH MG: 10 TABLET, FILM COATED ORAL at 17:28

## 2020-01-02 RX ADMIN — ATORVASTATIN CALCIUM SCH MG: 20 TABLET, FILM COATED ORAL at 22:50

## 2020-01-02 RX ADMIN — HEPARIN SODIUM SCH: 5000 INJECTION, SOLUTION INTRAVENOUS; SUBCUTANEOUS at 22:51

## 2020-01-02 RX ADMIN — Medication SCH: at 22:50

## 2020-01-02 RX ADMIN — Medication SCH: at 01:40

## 2020-01-02 RX ADMIN — BUSPIRONE HYDROCHLORIDE SCH MG: 10 TABLET ORAL at 22:50

## 2020-01-02 RX ADMIN — BUSPIRONE HYDROCHLORIDE SCH MG: 10 TABLET ORAL at 11:14

## 2020-01-02 RX ADMIN — HEPARIN SODIUM SCH: 5000 INJECTION, SOLUTION INTRAVENOUS; SUBCUTANEOUS at 05:11

## 2020-01-02 RX ADMIN — Medication SCH ML: at 13:42

## 2020-01-02 RX ADMIN — HEPARIN SODIUM SCH UNIT: 5000 INJECTION, SOLUTION INTRAVENOUS; SUBCUTANEOUS at 13:41

## 2020-01-02 RX ADMIN — CYCLOBENZAPRINE HYDROCHLORIDE SCH MG: 10 TABLET, FILM COATED ORAL at 11:14

## 2020-01-02 RX ADMIN — DIVALPROEX SODIUM SCH MG: 500 TABLET, FILM COATED, EXTENDED RELEASE ORAL at 11:13

## 2020-01-02 RX ADMIN — LIDOCAINE SCH PATCH: 50 PATCH CUTANEOUS at 11:15

## 2020-01-02 RX ADMIN — FOLIC ACID SCH MG: 1 TABLET ORAL at 11:13

## 2020-01-02 RX ADMIN — Medication SCH: at 01:41

## 2020-01-02 RX ADMIN — Medication SCH MG: at 11:13

## 2020-01-02 RX ADMIN — Medication SCH: at 06:11

## 2020-01-02 RX ADMIN — Medication SCH ML: at 13:43

## 2020-01-02 RX ADMIN — MULTIVITAMIN TABLET SCH TAB: TABLET at 11:13

## 2020-01-02 RX ADMIN — Medication SCH: at 22:35

## 2020-01-02 NOTE — PDOC DISCHARGE SUMMARY
Impression





- Admit/DC Date/PCP


Admission Date/Primary Care Provider: 


  12/26/19 22:59





  





Discharge Date: 01/02/20





- Discharge Diagnosis


(1) Alcohol intoxication


Is this a current diagnosis for this admission?: Yes   





(2) Acute respiratory failure


Is this a current diagnosis for this admission?: Yes   





(3) Alcohol abuse, daily use


Is this a current diagnosis for this admission?: Yes   





(4) Depression


Is this a current diagnosis for this admission?: Yes   





(5) PTSD (post-traumatic stress disorder)


Is this a current diagnosis for this admission?: Yes   





(6) TBI (traumatic brain injury)


Is this a current diagnosis for this admission?: Yes   





(7) Acute kidney injury superimposed on CKD


Is this a current diagnosis for this admission?: Yes   





- Additional Information


Resuscitation Status: Full Code


Referrals: 


[facility] [Provider Group]


Prescriptions: 


Buspirone HCl [Buspar 10 mg Tablet] 10 mg PO Q12 30 Days #60 tablet


Divalproex Sodium [Depakote  mg Tab.sr] 500 mg PO ACLUNCH 30 Days #30 

tab.sr.24h


Home Medications: 








Amitriptyline HCl [Elavil 10 mg Tablet] 10 mg PO HSP PRN 12/27/19 


Atorvastatin Calcium [Lipitor 40 mg Tablet] 20 mg PO QHS 12/27/19 


Cyclobenzaprine HCl [Flexeril 10 mg Tablet] 10 mg PO BID 12/27/19 


Duloxetine HCl [Cymbalta 30 mg Capsule.dr] 90 mg PO DAILY 12/27/19 


Duloxetine HCl [Cymbalta] 60 mg PO DAILY 12/27/19 


Prazosin HCl [Minipress] 1 mg PO QHS 12/27/19 


Tizanidine HCl 4 mg PO HSP PRN 12/27/19 


Buspirone HCl [Buspar 10 mg Tablet] 10 mg PO Q12 30 Days #60 tablet 01/02/20 


Divalproex Sodium [Depakote  mg Tab.sr] 500 mg PO ACLUNCH 30 Days #30 

tab.sr.24h 01/02/20 











History of Present Illiness


History of Present Illness: 


Mr. Alejandra is a 44yr old M with PMH of TBI in 2010 without residual deficits, 

PTSD and heavy ETOH daily use per wife. Per ED reports and information obtained 

from wife, pt was on his way to [place] and was found in the airport 

unresponsive. EMS was called and pt was brought to the ED. He had no response to

narcan trial and decision was made to intubate in the ED for airway protection. 

Head/neck/chest/abd/pelvis CT scan was grossly negative. Wife reports that pt 

drinks vodka and beer daily but states he attempts to hide his habit from her 

but hiding the large Liter vodka bottles around the house; she is unable to 

estimate the average amount of alcohol intake daily but states she believes he 

has been drinking since about 2012. He is prescribed medications for PTSD but 

she states he rarely takes these and prefers drinking. She denies pt complaining

of pain, feeling ill or having symptoms of illness prior to this episode.





Hospital Course


Hospital Course: 





(1) Alcohol intoxication


Alert and oriented x3.  Denies any visual or auditory hallucinations.  Vitals a

re WNL.  


Due to EtOH abuse complicated by combination of EtOH and benzos.  Alcohol level 

on admission 546. 


Patient initially admitted to ICU where he was intubated.  Extubated on 

12/20/2019.  Also to floor 12/28/2019.  


Monitor for withdrawals, PRN benzodiazepines, folic acid and thiamine.  Monitor 

for seizure and falls.


Patient medically optimized to be transferred to inpatient rehab if needed.





Note: I was asked by our psych department to [facility] Flanagan and Dr Pickard as there was a question of his alcohol withdrawal.


I talked both to Dr. Flanagan and Dr. Pickard and updated them about the fact 

that patient was not actively withdrawing or showing any sign of EtOH 

withdrawal.  


I updated them that patient is alert and oriented x4 and all his vitals are 

stable.  


Patient was admitted on 12/26/2019, transferred to ICU and downgraded to floor 

on 12/28/2019 and has not received any benzos since then. 





(2) Acute respiratory failure


Resolved.  SPO2 WNL on RA.


Due to alcohol and benzo intoxication.


Initially admitted to ICU where he was intubated.


Extubated on 12/20/2019.





(3) Alcohol abuse, daily use


Advised on abstinence.


Plan is to transfer patient to inpatient rehab as per psych recommendation.  

Please refer to psych note.  





(4) Depression


Denies any suicidal homicidal ideation.


Home meds are duloxetine, amitriptyline.


Restart home meds.


Started on Depakote and was transferred psych recommendation.


Patient plan to transfer to inpatient rehab as per psych recommendation. 


Please refer to note.





(5) PTSD (post-traumatic stress disorder)


Denies any suicidal or homicidal ideation.





(6) TBI (traumatic brain injury)


No focal neurologic deficits.  Alert and oriented 3.





(7) Acute kidney injury superimposed on CKD


Resolved.  Back to baseline.  History of CKD.  





Patient is making adequate amount of urine.  All electrolytes are WNL.  Patient 

is not volume overloaded.  Denies any uremic symptoms.  





Patient has history of CKD and as per chart review patient's creatinine was 1.39

on 8/22/2011 and has been stable since then.





Monitor volume status and electrolytes.  Replace as needed.  Outpatient PCP and 

nephrology follow-up.  Avoid nephrotoxic meds.





Physical Exam


Vital Signs: 


                                        











Temp Pulse Resp BP Pulse Ox


 


 98.4 F   84   18   123/70   100 


 


 01/02/20 15:59  01/02/20 15:59  01/02/20 15:59  01/02/20 15:59  01/02/20 15:59








                                 Intake & Output











 01/01/20 01/02/20 01/03/20





 06:59 06:59 06:59


 


Intake Total 1100 2030 480


 


Balance 1100 2030 480


 


Weight 106.1 kg 106 kg 











General appearance: PRESENT: no acute distress, well-developed, well-nourished


Head exam: PRESENT: atraumatic, normocephalic


Respiratory exam: PRESENT: clear to auscultation elisa.  ABSENT: rales, rhonchi, 

wheezes


Cardiovascular exam: PRESENT: RRR.  ABSENT: diastolic murmur, rubs, systolic 

murmur


GI/Abdominal exam: PRESENT: normal bowel sounds, soft.  ABSENT: distended, 

guarding, mass, organolmegaly, rebound, tenderness


Neurological exam: PRESENT: alert, awake, oriented to person, oriented to place,

oriented to time, oriented to situation, CN II-XII grossly intact.  ABSENT: 

motor sensory deficit





Results


Laboratory Results: 


                                        











WBC  4.1 10^3/uL (4.0-10.5)   12/29/19  05:15    


 


RBC  4.03 10^6/uL (4.35-5.55)  L  12/29/19  05:15    


 


Hgb  12.4 g/dL (13.5-17.0)  L  12/29/19  05:15    


 


Hct  36.1 % (37.9-51.0)  L  12/29/19  05:15    


 


MCV  90 fl (80-97)   12/29/19  05:15    


 


MCH  30.8 pg (27.0-33.4)   12/29/19  05:15    


 


MCHC  34.4 g/dL (32.0-36.0)   12/29/19  05:15    


 


RDW  12.9 % (11.5-14.0)   12/29/19  05:15    


 


Plt Count  157 10^3/uL (150-450)   12/29/19  05:15    


 


Lymph % (Auto)  34.2 % (13-45)   12/29/19  05:15    


 


Mono % (Auto)  14.4 % (3-13)  H  12/29/19  05:15    


 


Eos % (Auto)  4.3 % (0-6)   12/29/19  05:15    


 


Baso % (Auto)  1.1 % (0-2)   12/29/19  05:15    


 


Absolute Neuts (auto)  1.9 10^3/uL (1.7-8.2)   12/29/19  05:15    


 


Absolute Lymphs (auto)  1.4 10^3/uL (0.5-4.7)   12/29/19  05:15    


 


Absolute Monos (auto)  0.6 10^3/uL (0.1-1.4)   12/29/19  05:15    


 


Absolute Eos (auto)  0.2 10^3/uL (0.0-0.6)   12/29/19  05:15    


 


Absolute Basos (auto)  0.0 10^3/uL (0.0-0.2)   12/29/19  05:15    


 


Seg Neutrophils %  46.0 % (42-78)   12/29/19  05:15    


 


Carbonic Acid  1.12 mmol/L (1.05-1.35)   12/27/19  03:00    


 


HCO3/H2CO3 Ratio  18:1   12/27/19  03:00    


 


ABG pH  7.36  (7.35-7.45)   12/27/19  03:00    


 


ABG pCO2  37.1 mmHg (35-45)   12/27/19  03:00    


 


ABG pO2  239.9 mmHg ()  H  12/27/19  03:00    


 


ABG HCO3  20.5 mmol/L (20-24)   12/27/19  03:00    


 


ABG Total CO2  21.7 mmol/L (23-27)  L  12/27/19  03:00    


 


ABG O2 Saturation  99.5 % (94-98)  H  12/27/19  03:00    


 


ABG Base Excess  -4.3 mmol/L  12/27/19  03:00    


 


VBG pH  7.32  (7.30-7.42)   12/26/19  20:12    


 


VBG pCO2  49.0 mmHg (35-63)   12/26/19  20:12    


 


VBG HCO3  24.9 mmol/L (20-32)   12/26/19  20:12    


 


VBG Base Excess  -1.7 mmol/L  12/26/19  20:12    


 


FiO2  50%   12/27/19  03:00    


 


Sodium  138.6 mmol/L (137-145)   01/01/20  10:56    


 


Potassium  3.6 mmol/L (3.6-5.0)   01/01/20  10:56    


 


Chloride  103 mmol/L ()   01/01/20  10:56    


 


Carbon Dioxide  25 mmol/L (22-30)   01/01/20  10:56    


 


Anion Gap  11  (5-19)   01/01/20  10:56    


 


BUN  12 mg/dL (7-20)   01/01/20  10:56    


 


Creatinine  1.40 mg/dL (0.52-1.25)  H  01/01/20  10:56    


 


Est GFR ( Amer)  > 60  (>60)   01/01/20  10:56    


 


Est GFR (MDRD) Non-Af  55  (>60)  L  01/01/20  10:56    


 


Glucose  112 mg/dL ()  H  01/01/20  10:56    


 


Lactic Acid  3.3 mmol/L (0.7-2.1)  H  12/27/19  01:56    


 


Calcium  9.4 mg/dL (8.4-10.2)   01/01/20  10:56    


 


Phosphorus  4.3 mg/dL (2.5-4.5)   12/27/19  01:56    


 


Magnesium  2.0 mg/dL (1.6-2.3)   12/27/19  01:56    


 


Total Bilirubin  0.4 mg/dL (0.2-1.3)   12/30/19  05:55    


 


Direct Bilirubin  0.1 mg/dL (0.0-0.4)   12/30/19  05:55    


 


Neonat Total Bilirubin  Not Reportable   12/30/19  05:55    


 


Neonat Direct Bilirubin  Not Reportable   12/30/19  05:55    


 


Neonat Indirect Bili  Not Reportable   12/30/19  05:55    


 


AST  105 U/L (17-59)  H  12/30/19  05:55    


 


ALT  98 U/L (<50)   12/30/19  05:55    


 


Alkaline Phosphatase  62 U/L ()   12/30/19  05:55    


 


Creatine Kinase  705 U/L ()  H  12/26/19  20:12    


 


Troponin I  < 0.012 ng/mL  12/26/19  20:12    


 


NT-Pro-B Natriuret Pep  < 11 pg/mL (<125)   12/26/19  20:12    


 


Total Protein  7.8 g/dL (6.3-8.2)   12/30/19  05:55    


 


Albumin  3.9 g/dL (3.5-5.0)   12/30/19  05:55    


 


TSH  0.38 uIU/mL (0.47-4.68)  L  12/26/19  20:12    


 


Free T4  1.08 ng/dL (0.78-2.19)   12/27/19  01:56    


 


Free T3 pg/mL  4.98 pg/mL (2.77-5.27)   12/27/19  01:56    


 


Urine Color  STRAW   12/26/19  20:47    


 


Urine Appearance  CLEAR   12/26/19  20:47    


 


Urine pH  6.0  (5.0-9.0)   12/26/19  20:47    


 


Ur Specific Gravity  1.005   12/26/19  20:47    


 


Urine Protein  30 mg/dL (NEGATIVE)  H  12/26/19  20:47    


 


Urine Glucose (UA)  NEGATIVE mg/dL (NEGATIVE)   12/26/19  20:47    


 


Urine Ketones  NEGATIVE mg/dL (NEGATIVE)   12/26/19  20:47    


 


Urine Blood  SMALL  (NEGATIVE)  H  12/26/19  20:47    


 


Urine Nitrite (Reflex)  NEGATIVE  (NEGATIVE)   12/26/19  20:47    


 


Urine Bilirubin  NEGATIVE  (NEGATIVE)   12/26/19  20:47    


 


Urine Urobilinogen  NEGATIVE mg/dL (<2.0)   12/26/19  20:47    


 


Leukocyte Esterase Rfl  NEGATIVE  (NEGATIVE)   12/26/19  20:47    


 


Urine RBC (Auto)  0 /HPF  12/26/19  20:47    


 


U Hyaline Cast (Auto)  1 /LPF  12/26/19  20:47    


 


Urine WBC (Reflex)  < 1 /HPF  12/26/19  20:47    


 


Squamous Epi Cells Auto  <1 /HPF  12/26/19  20:47    


 


Urine Mucus (Auto)  RARE /LPF  12/26/19  20:47    


 


Urine Ascorbic Acid  NEGATIVE  (NEGATIVE)   12/26/19  20:47    


 


Salicylates  < 1.0 mg/dL (2.0-20.0)  L  12/26/19  20:12    


 


Urine Opiates Screen  NEGATIVE   12/26/19  20:47    


 


Urine Methadone Screen  NEGATIVE   12/26/19  20:47    


 


Acetaminophen  < 10 ug/mL (10-30)  L  12/26/19  20:12    


 


Ur Barbiturates Screen  NEGATIVE   12/26/19  20:47    


 


Ur Phencyclidine Scrn  NEGATIVE   12/26/19  20:47    


 


Ur Amphetamines Screen  NEGATIVE   12/26/19  20:47    


 


U Benzodiazepines Scrn  NEGATIVE   12/26/19  20:47    


 


Urine Cocaine Screen  NEGATIVE   12/26/19  20:47    


 


U Marijuana (THC) Screen  NEGATIVE   12/26/19  20:47    


 


Serum Alcohol  546 mg/dL (NONE DETECTED)  H*  12/26/19  20:12    








                                        











  12/26/19 12/26/19





  20:12 20:12


 


Troponin I  < 0.012 


 


NT-Pro-B Natriuret Pep   < 11











Impressions: 


                                        





KUB X-Ray  12/26/19 00:00


IMPRESSION:


 


Nonspecific bowel gas pattern. Tip of the enteric tube in the


stomach.


 


 


copyright 2011 [employer]- All Rights Reserved


 








Head CT  12/26/19 20:45


IMPRESSION:


 


No acute intracranial abnormality


 


TECHNICAL DOCUMENTATION:


 


Quality ID # [id number]: Final reports with documentation of one or more


dose reduction techniques (e.g., Automated exposure control,


adjustment of the mA and/or kV according to patient size, use of


iterative reconstruction technique)


 


copyright 2011 [employer]- All Rights Reserved


 








Cervical Spine CT  12/26/19 20:46


IMPRESSION: 


 


No significant findings in the cervical spine. Mild degenerative


changes at C5-6.


 


 


 








Abdomen/Pelvis CT  12/26/19 20:48


IMPRESSION:


 


No acute intrathoracic process. No acute intra-abdominal/pelvic


process.


 


TECHNICAL DOCUMENTATION:


 


Quality ID # 436: Final reports with documentation of one or more


dose reduction techniques (e.g., Automated exposure control,


adjustment of the mA and/or kV according to patient size, use of


iterative reconstruction technique)


 


copyright 2011 [employer]- All Rights Reserved


 








Chest CT  12/26/19 20:48


IMPRESSION:


 


No acute intrathoracic process. No acute intra-abdominal/pelvic


process.


 


TECHNICAL DOCUMENTATION:


 


Quality ID # [id number]: Final reports with documentation of one or more


dose reduction techniques (e.g., Automated exposure control,


adjustment of the mA and/or kV according to patient size, use of


iterative reconstruction technique)


 


copyright 2011 [employer]- All Rights Reserved


 








Chest X-Ray  12/27/19 04:00


IMPRESSION:


 


Placement of an enteric tube. Other findings are grossly stable


 


 


copyright 2011 [employer]- All Rights Reserved


 














Plan


Goals: 


patient will call and schedule





Stroke


Is this a Stroke Patient?: No





Acute Heart Failure





- **


Is this a Heart Failure Patient?: No

## 2020-01-02 NOTE — PDOC PROGRESS REPORT
Subjective


Progress Note for:: 01/02/20


Subjective:: 


Mr. Alejandra is a 44yr old M with PMH of TBI in 2010 without residual deficits, 

PTSD and heavy ETOH daily use per wife. Per ED reports and information obtained 

from wife, pt was on his way to [place] and was found in the airport 

unresponsive. EMS was called and pt was brought to the ED. He had no response to

narcan trial and decision was made to intubate in the ED for airway protection. 

Head/neck/chest/abd/pelvis CT scan was grossly negative. Wife reports that pt dr

inks vodka and beer daily but states he attempts to hide his habit from her but 

hiding the large Liter vodka bottles around the house; she is unable to estimate

the average amount of alcohol intake daily but states she believes he has been 

drinking since about 2012. He is prescribed medications for PTSD but she states 

he rarely takes these and prefers drinking. She denies pt complaining of pain, f

eeling ill or having symptoms of illness prior to this episode.





12/29/2019.  No acute events overnight.  Patient resting in bed comfortably no 

apparent distress complaining of sore chest otherwise denies any fever, chills, 

nausea, vomiting, shortness of breath, anxiety, hallucination, formication, 

headache, nausea, vomiting, diarrhea, constipation or any urinary symptoms.





12/30/2019.  No acute events overnight.  Patient alert at this time 3 

cooperative with physical examination very pleasant.  Anxious to be discharged 

today can go home.  Unfortunate patient in IVC and pending transfer to inpatient

psych.





12/31/2019.  No acute events overnight.  Saw patient this afternoon, accompanied

by his wife, in no apparent distress, patient very frustrated about his IVC 

status and very anxious to be released soon.





1/1/2020.  No acute events overnight.  Saw patient this morning.  Comfortable 

resting in bed.  Denies any fever, chills, nausea, vomiting, diarrhea, 

constipation or any urinary symptoms.  P.o. tolerant, having normal bowel and 

bladder movement.  Very anxious to be discharged.





1/2/2020.  No acute events overnight.  Patient resting comfortably.  Denies any 

fever, chills, nausea, vomiting, diarrhea, constipation or any urinary symptoms.

 Alert and oriented x4.  Very anxious to be discharged home or transfer to 

inpatient rehab.  


Reason For Visit: 


AMS,ETOH INTOXICATION,ACUTE RESPIRATORY FAILURE








Physical Exam


Vital Signs: 


                                        











Temp Pulse Resp BP Pulse Ox


 


 98.3 F   84   18   114/75   98 


 


 01/02/20 11:48  01/02/20 11:48  01/02/20 11:48  01/02/20 11:48  01/02/20 11:48








                                 Intake & Output











 01/01/20 01/02/20 01/03/20





 06:59 06:59 06:59


 


Intake Total 1100 2030 


 


Balance 1100 2030 


 


Weight 106.1 kg 106 kg 











General appearance: PRESENT: obese


Head exam: PRESENT: atraumatic, normocephalic


Respiratory exam: PRESENT: clear to auscultation elisa.  ABSENT: rales, rhonchi, 

wheezes


Cardiovascular exam: PRESENT: RRR.  ABSENT: diastolic murmur, rubs, systolic 

murmur


GI/Abdominal exam: PRESENT: normal bowel sounds, soft.  ABSENT: distended, 

guarding, mass, organolmegaly, rebound, tenderness


Neurological exam: PRESENT: alert, awake, oriented to person, oriented to place,

oriented to time, oriented to situation, CN II-XII grossly intact.  ABSENT: 

motor sensory deficit





Results


Laboratory Results: 


                                        





                                 12/29/19 05:15 





                                 01/01/20 10:56 





                                        











  12/26/19 12/26/19 12/26/19





  20:12 20:12 20:12


 


Creatine Kinase  705 H  


 


Troponin I   < 0.012 


 


NT-Pro-B Natriuret Pep    < 11











Impressions: 


                                        





KUB X-Ray  12/26/19 00:00


IMPRESSION:


 


Nonspecific bowel gas pattern. Tip of the enteric tube in the


stomach.


 


 


copyright 2011 [employer]- All Rights Reserved


 








Head CT  12/26/19 20:45


IMPRESSION:


 


No acute intracranial abnormality


 


TECHNICAL DOCUMENTATION:


 


Quality ID # 436: Final reports with documentation of one or more


dose reduction techniques (e.g., Automated exposure control,


adjustment of the mA and/or kV according to patient size, use of


iterative reconstruction technique)


 


copyright 2011 [employer]- All Rights Reserved


 








Cervical Spine CT  12/26/19 20:46


IMPRESSION: 


 


No significant findings in the cervical spine. Mild degenerative


changes at C5-6.


 


 


 








Abdomen/Pelvis CT  12/26/19 20:48


IMPRESSION:


 


No acute intrathoracic process. No acute intra-abdominal/pelvic


process.


 


TECHNICAL DOCUMENTATION:


 


Quality ID # 436: Final reports with documentation of one or more


dose reduction techniques (e.g., Automated exposure control,


adjustment of the mA and/or kV according to patient size, use of


iterative reconstruction technique)


 


copyright 2011 [employer]- All Rights Reserved


 








Chest CT  12/26/19 20:48


IMPRESSION:


 


No acute intrathoracic process. No acute intra-abdominal/pelvic


process.


 


TECHNICAL DOCUMENTATION:


 


Quality ID # 436: Final reports with documentation of one or more


dose reduction techniques (e.g., Automated exposure control,


adjustment of the mA and/or kV according to patient size, use of


iterative reconstruction technique)


 


copyright 2011 [employer]- All Rights Reserved


 








Chest X-Ray  12/27/19 04:00


IMPRESSION:


 


Placement of an enteric tube. Other findings are grossly stable


 


 


copyright 2011 [employer]- All Rights Reserved


 














Assessment and Plan





- Diagnosis


(1) Alcohol intoxication


Qualifiers: 


   Complication of substance-induced condition: with unspecified complication   

Qualified Code(s): F10.929 - Alcohol use, unspecified with intoxication, 

unspecified   


Is this a current diagnosis for this admission?: Yes   


Plan: 


Alert and oriented x3.  Denies any visual or auditory hallucinations.  Vitals 

are WNL.  


Due to EtOH abuse complicated by combination of EtOH and benzos.  Alcohol level 

on admission 546. 


Patient initially admitted to ICU where he was intubated.  Extubated on 

12/20/2019.  Also to floor 12/28/2019.  


Monitor for withdrawals, PRN benzodiazepines, folic acid and thiamine.  Monitor 

for seizure and falls.


Patient medically optimized to be transferred to inpatient rehab if needed.





Note: I was asked by our psych department to do a [facility] peer [facility] with [facility]. Flanagan and Dr Pickard as there was a question of his alcohol withdrawal.


I talked both to Dr. Flanagan and Dr. Pickard and updated them about the fact t

hat patient was not actively withdrawing or showing any sign of EtOH withdrawal.

 


I updated them that patient is alert and oriented x4 and all his vitals are 

stable.  


Patient was admitted on 12/26/2019, transferred to ICU and downgraded to floor 

on 12/28/2019 and has not received any benzos since then. 








(2) Acute respiratory failure


Qualifiers: 


   Respiratory failure complication: unspecified whether with hypoxia or 

hypercapnia   Qualified Code(s): J96.00 - Acute respiratory failure, unspecified

whether with hypoxia or hypercapnia   


Is this a current diagnosis for this admission?: Yes   


Plan: 


Resolved.  SPO2 WNL on RA.


Due to alcohol and benzo intoxication.


Initially admitted to ICU where he was intubated.


Extubated on 12/20/2019.








(3) Alcohol abuse, daily use


Is this a current diagnosis for this admission?: Yes   


Plan: 


Advised on abstinence.


Plan is to transfer patient to inpatient rehab as per psych recommendation.  

Please refer to psych note.  








(4) Depression


Qualifiers: 


   Depression Type: unspecified   Qualified Code(s): F32.9 - Major depressive 

disorder, single episode, unspecified   


Is this a current diagnosis for this admission?: Yes   


Plan: 


Denies any suicidal homicidal ideation.


Home meds are duloxetine, amitriptyline.


Restart home meds.


Started on Depakote and was transferred psych recommendation.


Patient plan to transfer to inpatient rehab as per psych recommendation. 


Please refer to note.








(5) PTSD (post-traumatic stress disorder)


Is this a current diagnosis for this admission?: Yes   


Plan: 


Denies any suicidal or homicidal ideation.








(6) TBI (traumatic brain injury)


Qualifiers: 


   Encounter type: sequela 


Is this a current diagnosis for this admission?: Yes   


Plan: 


No focal neurologic deficits.  Alert and oriented 3.








(7) Acute kidney injury superimposed on CKD


Is this a current diagnosis for this admission?: Yes   


Plan: 


Resolved.  Back to baseline.  History of CKD.  





Patient is making adequate amount of urine.  All electrolytes are WNL.  Patient 

is not volume overloaded.  Denies any uremic symptoms.  





Patient has history of CKD and as per chart review patient's creatinine was 1.39

on 8/22/2011 and has been stable since then.





Monitor volume status and electrolytes.  Replace as needed.  Outpatient PCP and 

nephrology follow-up.  Avoid nephrotoxic meds.

## 2020-01-03 VITALS — SYSTOLIC BLOOD PRESSURE: 121 MMHG | DIASTOLIC BLOOD PRESSURE: 76 MMHG

## 2020-07-29 ENCOUNTER — HOSPITAL ENCOUNTER (OUTPATIENT)
Dept: HOSPITAL 62 - LAB | Age: 45
End: 2020-07-29
Attending: SPECIALIST
Payer: OTHER GOVERNMENT

## 2020-07-29 DIAGNOSIS — N46.9: Primary | ICD-10-CM

## 2020-07-29 LAB
SPERM # SMN: 134.3 X10^6 (ref 33–?)
SPERM CONCENTRATION: 46.3 X10^6/ML (ref 12–?)
SPERM MOTILE NFR SMN: 69 % (ref 38–?)
SPERM PROG SMN QL: 3
SPERM SMN: (no result)

## 2020-07-29 PROCEDURE — 89320 SEMEN ANAL VOL/COUNT/MOT: CPT

## 2020-09-10 ENCOUNTER — HOSPITAL ENCOUNTER (EMERGENCY)
Dept: HOSPITAL 62 - ER | Age: 45
LOS: 1 days | Discharge: HOME | End: 2020-09-11
Payer: OTHER GOVERNMENT

## 2020-09-10 DIAGNOSIS — E78.5: ICD-10-CM

## 2020-09-10 DIAGNOSIS — Z87.820: ICD-10-CM

## 2020-09-10 DIAGNOSIS — I10: ICD-10-CM

## 2020-09-10 DIAGNOSIS — R07.9: Primary | ICD-10-CM

## 2020-09-10 PROCEDURE — 93005 ELECTROCARDIOGRAM TRACING: CPT

## 2020-09-10 PROCEDURE — 84484 ASSAY OF TROPONIN QUANT: CPT

## 2020-09-10 PROCEDURE — 71046 X-RAY EXAM CHEST 2 VIEWS: CPT

## 2020-09-10 PROCEDURE — 82553 CREATINE MB FRACTION: CPT

## 2020-09-10 PROCEDURE — 36415 COLL VENOUS BLD VENIPUNCTURE: CPT

## 2020-09-10 PROCEDURE — 85379 FIBRIN DEGRADATION QUANT: CPT

## 2020-09-10 PROCEDURE — 93010 ELECTROCARDIOGRAM REPORT: CPT

## 2020-09-10 PROCEDURE — 85610 PROTHROMBIN TIME: CPT

## 2020-09-10 PROCEDURE — 80053 COMPREHEN METABOLIC PANEL: CPT

## 2020-09-10 PROCEDURE — 85025 COMPLETE CBC W/AUTO DIFF WBC: CPT

## 2020-09-10 PROCEDURE — 85730 THROMBOPLASTIN TIME PARTIAL: CPT

## 2020-09-10 PROCEDURE — 82550 ASSAY OF CK (CPK): CPT

## 2020-09-10 PROCEDURE — 99285 EMERGENCY DEPT VISIT HI MDM: CPT

## 2020-09-10 PROCEDURE — 83735 ASSAY OF MAGNESIUM: CPT

## 2020-09-10 NOTE — RADIOLOGY REPORT (SQ)
CLINICAL INDICATION: Chest pain x4 5 days.



TECHNIQUE: PA and lateral views were obtained of the chest



COMPARISON: December 27, 2019.



FINDINGS: The cardiomediastinal  silhouette is top normal. The

lungs are grossly clear.  No evidence of effusion or

pneumothorax.  Visualized bones are unremarkable. .



IMPRESSION: No evidence of active intrathoracic disease  .



The cause of the patient's chest pain is not identified on this

examination.

## 2020-09-10 NOTE — ER DOCUMENT REPORT
ED Medical Screen (RME)





- General


Chief Complaint: Back Pain


Stated Complaint: CLINIC REFERRAL CARDIAC CONCERN


Time Seen by Provider: 09/10/20 21:51


Primary Care Provider: 


DARSHAN HECK MD [Primary Care Provider] - Follow up as needed


Mode of Arrival: Ambulatory


Information source: Patient


Notes: 





44-year-old male presented to ED for complaint of chest pain and back pain x4 5 

days.  He states the pain is worse when he moves or takes a deep breath.  He 

states he is only short of breath at nighttime or sometimes when he is watching 

TV.  He states he does not smoke he has had no recent surgery he has had no 

recent travel.  He states he does drink once a week does not use any illicit 

drugs.  He states he does have a history of cholesterol depression high blood 

pressure traumatic brain injury PTSD a fractured leg with surgery not recent and

a back injury.  I have discussed this patient with Dr. Moore.  Will do a normal 

chest pain protocol and add a d-dimer as he was sent over by [facility] to 

rule out a PE.

















I have greeted and performed a rapid initial assessment of this patient.  A 

comprehensive ED assessment and evaluation of the patient, analysis of test 

results and completion of medical decision making process will be conducted by 

an additional ED providers.


TRAVEL OUTSIDE OF THE [place] IN LAST 30 DAYS: No





- Related Data


Allergies/Adverse Reactions: 


                                        





No Known Allergies Allergy (Verified 09/10/20 21:36)


   











Past Medical History





- Social History


Frequency of alcohol use: Occasional


Drug Abuse: None


Family history: None





- Past Medical History


Cardiac Medical History: Reports: Hx Hypertension


Neurological Medical History: Denies: Hx Seizures


Renal/ Medical History: Denies: Hx Peritoneal Dialysis


Musculoskeltal Medical History: Reports Hx Musculoskeletal Deformity, Reports Hx

Musculoskeletal Trauma


Psychiatric Medical History: Reports: Hx Depression, Hx Post Traumatic Stress 

Disorder


Traumatic Medical History: Reports: Hx Traumatic Brain Injury


Past Surgical History: Reports: Hx Orthopedic Surgery - LLE for compartment 

syndrome





- Immunizations


Immunizations up to date: Yes


Hx Diphtheria, Pertussis, Tetanus Vaccination: Yes - 2012





Doctor's Discharge





- Discharge


Referrals: 


DARSHAN HECK MD [Primary Care Provider] - Follow up as needed

## 2020-09-11 VITALS — DIASTOLIC BLOOD PRESSURE: 90 MMHG | SYSTOLIC BLOOD PRESSURE: 112 MMHG

## 2020-09-11 LAB
ADD MANUAL DIFF: NO
ALBUMIN SERPL-MCNC: 4.8 G/DL (ref 3.5–5)
ALP SERPL-CCNC: 52 U/L (ref 38–126)
ANION GAP SERPL CALC-SCNC: 10 MMOL/L (ref 5–19)
APTT BLD: 29.9 SEC (ref 23.5–35.8)
AST SERPL-CCNC: 53 U/L (ref 17–59)
BASOPHILS # BLD AUTO: 0 10^3/UL (ref 0–0.2)
BASOPHILS NFR BLD AUTO: 0.8 % (ref 0–2)
BILIRUB DIRECT SERPL-MCNC: 0.2 MG/DL (ref 0–0.4)
BILIRUB SERPL-MCNC: 0.7 MG/DL (ref 0.2–1.3)
BUN SERPL-MCNC: 14 MG/DL (ref 7–20)
CALCIUM: 9.9 MG/DL (ref 8.4–10.2)
CHLORIDE SERPL-SCNC: 103 MMOL/L (ref 98–107)
CK MB SERPL-MCNC: 2.36 NG/ML (ref ?–4.55)
CK SERPL-CCNC: 420 U/L (ref 55–170)
CO2 SERPL-SCNC: 25 MMOL/L (ref 22–30)
D DIMER PPP FEU-MCNC: < 0.27 UG/ML (ref 0–0.5)
EOSINOPHIL # BLD AUTO: 0.1 10^3/UL (ref 0–0.6)
EOSINOPHIL NFR BLD AUTO: 2.4 % (ref 0–6)
ERYTHROCYTE [DISTWIDTH] IN BLOOD BY AUTOMATED COUNT: 13.2 % (ref 11.5–14)
GLUCOSE SERPL-MCNC: 114 MG/DL (ref 75–110)
HCT VFR BLD CALC: 44.5 % (ref 37.9–51)
HGB BLD-MCNC: 15 G/DL (ref 13.5–17)
INR PPP: 1
LYMPHOCYTES # BLD AUTO: 2.2 10^3/UL (ref 0.5–4.7)
LYMPHOCYTES NFR BLD AUTO: 41.7 % (ref 13–45)
MCH RBC QN AUTO: 30.7 PG (ref 27–33.4)
MCHC RBC AUTO-ENTMCNC: 33.8 G/DL (ref 32–36)
MCV RBC AUTO: 91 FL (ref 80–97)
MONOCYTES # BLD AUTO: 0.5 10^3/UL (ref 0.1–1.4)
MONOCYTES NFR BLD AUTO: 10.1 % (ref 3–13)
NEUTROPHILS # BLD AUTO: 2.3 10^3/UL (ref 1.7–8.2)
NEUTS SEG NFR BLD AUTO: 45 % (ref 42–78)
PLATELET # BLD: 188 10^3/UL (ref 150–450)
POTASSIUM SERPL-SCNC: 4.3 MMOL/L (ref 3.6–5)
PROT SERPL-MCNC: 9 G/DL (ref 6.3–8.2)
PROTHROMBIN TIME: 13.4 SEC (ref 11.4–15.4)
RBC # BLD AUTO: 4.89 10^6/UL (ref 4.35–5.55)
TOTAL CELLS COUNTED % (AUTO): 100 %
TROPONIN I SERPL-MCNC: < 0.012 NG/ML
WBC # BLD AUTO: 5.2 10^3/UL (ref 4–10.5)

## 2020-09-11 NOTE — ER DOCUMENT REPORT
ED Cardiac





- General


Mode of Arrival: Ambulatory


Information source: Patient


TRAVEL OUTSIDE OF THE [place] IN LAST 30 DAYS: No





<WELLEN,SHERYLE D - Last Filed: 09/11/20 07:57>





<BRISEYDA ROSAS - Last Filed: 09/11/20 09:14>





- General


Chief Complaint: Chest Pain > 30


Stated Complaint: CLINIC REFERRAL CARDIAC CONCERN


Time Seen by Provider: 09/10/20 21:51


Primary Care Provider: 


DARSHAN HECK MD [EMERITUS] - Follow up tomorrow (Call today for an 

outpatient follow-up appointment.)


Notes: 





44-year-old male past medical history significant for hyperlipidemia, traumatic 

brain injury, depression, PTSD, compartment syndrome presents to the emergency 

room complaining of intermittent sharp midsternal chest pain for the past 5 

days.  States increases with movement.  Radiates to his back.  Denies any 

nausea, vomiting, no diaphoresis.  Has not been taking any medications for 

symptoms.  Patient states he was seen at [facility] and was referred to the 

emergency room for further evaluation of his chest pain and to rule out possible

 PE.  Patient does deny any recent travel.  No recent surgeries, no history of 

PEs or DVTs, no hemoptysis, not tachycardic. (WELLEN,SHERYLE D)





- Related Data


Allergies/Adverse Reactions: 


                                        





No Known Allergies Allergy (Verified 09/10/20 21:36)


   











Past Medical History





- General


Information source: Patient





- Social History


Smoking Status: Never Smoker


Frequency of alcohol use: Occasional


Drug Abuse: None


Family History: Hypertension.  denies: CAD





- Past Medical History


Cardiac Medical History: Reports: Hx Hypertension


Neurological Medical History: Denies: Hx Seizures


Renal/ Medical History: Denies: Hx Peritoneal Dialysis


Musculoskeletal Medical History: Reports Hx Musculoskeletal Deformity, Reports 

Hx Musculoskeletal Trauma


Psychiatric Medical History: Reports: Hx Depression, Hx Post Traumatic Stress 

Disorder


Traumatic Medical History: Reports: Hx Traumatic Brain Injury


Past Surgical History: Reports: Hx Orthopedic Surgery - LLE for compartment 

syndrome





- Immunizations


Immunizations up to date: Yes


Hx Diphtheria, Pertussis, Tetanus Vaccination: Yes - 2012





<WELLEN,SHERYLE D - Last Filed: 09/11/20 07:57>





Review of Systems





- Review of Systems


Constitutional: No symptoms reported


EENT: No symptoms reported


Cardiovascular: Chest pain.  denies: Dyspnea


Respiratory: denies: Short of breath


Gastrointestinal: No symptoms reported.  denies: Nausea, Vomiting


Musculoskeletal: No symptoms reported


Skin: No symptoms reported


Hematologic/Lymphatic: No symptoms reported


Neurological/Psychological: No symptoms reported


-: Yes All other systems reviewed and negative





<WELLEN,SHERYLE D - Last Filed: 09/11/20 07:57>





Physical Exam





- General


General appearance: Appears well, Alert


In distress: Mild





- HEENT


Head: Normocephalic, Atraumatic


Eyes: Normal


Pupils: PERRL





- Respiratory


Respiratory status: No respiratory distress


Chest status: Tender - Tenderness on palpation over the midsternal area.  No 

obvious deformity noted.


Breath sounds: Normal


Chest palpation: Normal





- Cardiovascular


Rhythm: Regular


Heart sounds: Normal auscultation


Murmur: No





- Abdominal


Inspection: Normal


Distension: No distension


Bowel sounds: Normal


Tenderness: Nontender


Organomegaly: No organomegaly





- Back


Back: Normal, Nontender.  No: Deformity/step-off, CVA tenderness, Vertebra tende

rness





- Neurological


Neuro grossly intact: Yes


Cognition: Normal


Orientation: AAOx4


Lyle Coma Scale Eye Opening: Spontaneous


Yasmani Coma Scale Verbal: Oriented


Lyle Coma Scale Motor: Obeys Commands


Lyle Coma Scale Total: 15


Speech: Normal


Motor strength normal: LUE, RUE, LLE, RLE


Sensory: Normal





- Skin


Skin Temperature: Warm


Skin Moisture: Dry


Skin Color: Normal





<WELLEN,SHERYLE D - Last Filed: 09/11/20 07:57>





- Vital signs


Vitals: 


                                        











Temp Pulse BP Pulse Ox


 


 98.5 F   79   143/93 H  99 


 


 09/11/20 01:58  09/11/20 01:58  09/11/20 01:58  09/11/20 01:58














Course





- Laboratory


Result Diagrams: 


                                 09/11/20 04:30





                                 09/11/20 04:30





- Diagnostic Test


Radiology reviewed: Reports reviewed





- EKG Interpretation by Me


EKG shows normal: Sinus rhythm


Rate: Normal





<WELLEN,SHERYLE D - Last Filed: 09/11/20 07:57>





- Laboratory


Result Diagrams: 


                                 09/11/20 04:30





                                 09/11/20 04:30





<BRISEYDA ROSAS - Last Filed: 09/11/20 09:14>





- Re-evaluation


Re-evalutation: 





09/11/20 05:28


HEART Score:





History   1   


ECG   0   


Age   0   


Risk Factors   2


Troponin   0





Total: *3





 If HEART score is = 3 AND both tronponin measurments are normal, the 30 day 

risk of a major adverse cardiac event (all-cause mortality, myocardia infarction

 or need for coronary revscularization) is < 1% (Sensitivity 100%, %).








Chest pain in a patient without evidence of cardiac or other serious etiology on

 workup today. I discussed with patient that, based on their age, risk factors 

and emergency department testing today, the likelihood that their symptoms are 

related to a heart attack is very low (estimated risk of heart attack or death 

over the next 30 days of less than 1%).  The patient demonstrates decision 

making capacity and has verbalized an understanding of these risks to me. Based 

on this,  the patient has chosen to follow-up as an outpatient. Usual chest pain

 return precautions reviewed. The patient states understanding and agreement 

with this plan.


09/11/20 06:47





Patient is resting comfortably sleeping but easily arousable.  Pain-free on 

exam.  Reviewed all labs, EKG, and chest x-ray results with patient.  Patient is

 aware that we need to do a repeat troponin at 730 if repeat troponin remains 

negative patient will be discharged home.


 (WELLEN,SHERYLE D)





09/11/20 09:12


Delta troponin reviewed, no concern for MI or ACS at this time.  Patient with 

reproducible chest wall tenderness.  Discussed plan of care with patient, 

patient is agreeable with discharge at this time. (BRISEYDA ROSAS)





- Vital Signs


Vital signs: 


                                        











Temp Pulse Resp BP Pulse Ox


 


 98.5 F   79   12   116/81   93 


 


 09/11/20 01:58  09/11/20 01:58  09/11/20 08:31  09/11/20 08:31  09/11/20 08:31














- Laboratory


Laboratory results interpreted by me: 


                                        











  09/11/20





  04:30


 


Creatinine  1.33 H


 


Est GFR (MDRD) Non-Af  59 L


 


Glucose  114 H


 


ALT  55 H


 


Creatine Kinase  420 H


 


Total Protein  9.0 H














- EKG Interpretation by Me


Additional EKG results interpreted by me: 





09/11/20 05:27


EKG interpreted by ER physician Dr. Moore


No acute STEMI


Normal sinus rhythm


Rate 97


Normal axis


No ST wave abnormalities. (WELLEN,SHERYLE D)





Discharge





<WELLEN,SHERYLE D - Last Filed: 09/11/20 07:57>





<LUIZ ROSASJAMAICATINY - Last Filed: 09/11/20 09:14>





- Discharge


Clinical Impression: 


Chest pain


Qualifiers:


 Chest pain type: unspecified Qualified Code(s): R07.9 - Chest pain, unspecified





Condition: Stable


Disposition: HOME, SELF-CARE


Instructions:  Chest Pain of Unclear Cause ([facility]), Chest Wall Pain ([facility])


Additional Instructions: 


It is important that you follow-up outpatient with your primary care physician 

for further evaluation of your chest pain.  Return to the emergency room for any

 new or worsening symptoms.





You were seen today for chest pain.  The exact cause of your pain is unclear. 

However, based on your cardiac enzyme testing, chest x-ray, and EKG it does not 

appear that it is from an immediately life-threatening cause at this time.  

Although your testing here is normal is critical that you follow-up with your 

primary care physician for continued evaluation of this chest pain and possible 

stress testing.  I recommended you see your physician within the next 24-48 

hours to be evaluated for consideration of a stress test.  Please return to 

emergency department immediately if you have worsening of your chest pain, 

shortness of breath, vomiting, become unable to exert yourself due to pain or 

difficulty breathing, you pass out, or have any pain that radiates into your 

arms, jaw, or back. Please also return if you have any additional symptoms that 

are concerning to you.








Prescriptions: 


Naproxen [Naprosyn 250 Nmg Tablet] 1 tab PO BID #14 tablet


Methocarbamol [Robaxin 500 Mg Tablet] 500 mg PO QID PRN #24 tablet


 PRN Reason: 


Referrals: 


DARSHAN HECK MD [EMERITUS] - Follow up tomorrow (Call today for an 

outpatient follow-up appointment.)


BUSHRA HOLMAN MD [ACTIVE STAFF] - Follow up as needed

## 2021-04-20 NOTE — ER DOCUMENT REPORT
HPI





- HPI


Patient complains to provider of: Low back pain


Onset: Other - 13 years


Onset/Duration: Worse


Quality of pain: Sharp


Pain Level: 4


Context: 





Patient complains of chronic back pain for the past 13 years that worsened 10 

days ago.  Patient states that he thought that he was starting to gain weight 

and was attempting to increase his exercise by doing sit ups.  Patient states 

the following day his back pain flared up.  Patient denies any radiculopathy, 

paresthesia urinary retention or incontinence symptoms.  Patient denies any 

fever.  Patient denies any other new trauma.  Patient is concerned that he 

needs x-rays because of how sharp his back pain is.


Associated Symptoms: Other - Low back pain.  denies: Fever


Exacerbated by: Movement


Relieved by: Denies


Similar symptoms previously: Yes


Recently seen / treated by doctor: No





- ROS


ROS below otherwise negative: Yes


Systems Reviewed and Negative: Yes All other systems reviewed and negative





- CONSTITUTIONAL


Constitutional: DENIES: Fever, Chills





- NEURO


Neurology: DENIES: Headache, Weakness





- GASTROINTESTINAL


Gastrointestinal: DENIES: Nausea, Patient vomiting





- URINARY


Urinary: DENIES: Dysuria





- MUSCULOSKELETAL


Musculoskeletal: REPORTS: Back Pain.  DENIES: Extremity pain, Neck Pain





- DERM


Skin Color: Normal


Skin Problems: None





Past Medical History





- General


Information source: Patient





- Social History


Smoking Status: Never Smoker


Frequency of alcohol use: Occasional


Drug Abuse: None


Occupation: None


Lives with: Spouse/Significant other


Family History: Hypertension.  denies: CAD





- Past Medical History


Cardiac Medical History: Reports: Hx Hypertension


Renal/ Medical History: Denies: Hx Peritoneal Dialysis


Musculoskeltal Medical History: Reports Hx Musculoskeletal Deformity, Reports 

Hx Musculoskeletal Trauma, Reports Other - Tronic back pain


Psychiatric Medical History: Reports: Hx Depression, Hx Post Traumatic Stress 

Disorder


Past Surgical History: Reports: Hx Orthopedic Surgery - LLE for compartment 

syndrome





- Immunizations


Immunizations up to date: Yes


Hx Diphtheria, Pertussis, Tetanus Vaccination: Yes - 2012





Vertical Provider Document





- CONSTITUTIONAL


Agree With Documented VS: Yes


Exam Limitations: No Limitations


General Appearance: WD/WN, No Apparent Distress


Notes: 





PHYSICAL EXAMINATION:





GENERAL: Well-appearing, well-nourished and in no acute distress.





HEAD: Atraumatic, normocephalic.





EYES: sclera clear, anicteric, conjunctiva are normal.





ENT: nares patent, Moist mucous membranes.





NECK: Normal range of motion, supple no lymphadenopathy





LUNGS: respirations unlabored





HEART: Regular rate and rhythm without murmurs 





EXTREMITIES: Normal range of motion, no pitting or edema.  No cyanosis. Gait 

normal, pt ambulates without difficulty





BACK: Lower lumbar paraspinal tenderness, lower lumbar midline tenderness, no 

deformities or step-offs.  No CVA tenderness. 





NEUROLOGICAL: Cranial nerves grossly intact.  Normal speech, normal gait.  No 

saddle anesthesia.  1+ bilateral patellar and Achilles reflexes, no footdrop





PSYCH: Normal mood, normal affect.





SKIN: Warm, Dry, normal turgor, no rashes or lesions noted.











- INFECTION CONTROL


TRAVEL OUTSIDE OF THE [place] IN LAST 30 DAYS: No





- RESPIRATORY


O2 Sat by Pulse Oximetry: 97





Course





- Re-evaluation


Re-evalutation: 





03/02/18 17:19


Discussed with patient plan of care.  Patient is insistent that he needs an x-

ray of his low back.  Patient advised that he will likely need outpatient follow

-up with his primary doctor for referral for an outpatient MRI as well as pain 

management and physical therapy referrals.  Patient still insistent that he 

needs an x-ray of his back.


03/02/18 18:10


The patient presents with low back pain without signs of spinal cord compression

, cauda equina syndrome, infection, aneurysm, or other serious etiology.  The 

patient is neurologically intact.  Given the extremely risk of these diagnoses 

further testing and evaluation for these possibilities does not appear to be 

indicated at this time.  Patient has been instructed to return if the symptoms 

worsen or change in any way.





- Vital Signs


Vital signs: 


 











Temp Pulse Resp BP Pulse Ox


 


 98.4 F   93   20   132/85 H  97 


 


 03/02/18 16:27  03/02/18 16:27  03/02/18 16:27  03/02/18 16:27  03/02/18 16:27














- Diagnostic Test


Radiology reviewed: Reports reviewed





Discharge





- Discharge


Clinical Impression: 


Low back pain


Qualifiers:


 Chronicity: chronic Back pain laterality: bilateral Sciatica presence: without 

sciatica Qualified Code(s): M54.5 - Low back pain





Condition: Stable


Disposition: HOME, SELF-CARE


Instructions:  Ice Packs ([facility]), Low Back Pain ([facility]), Oral Narcotic Medication (

[facility]), Warm Packs ([facility])


Additional Instructions: 


Return immediately for any new or worsening symptoms





Followup with your primary care provider, call tomorrow to make a followup 

appointment





You may benefit from a referral to physical therapy as well as pain management 

for further management of her chronic low back pain symptoms.





You should also follow-up with a spinal specialist.  Your primary doctor can 

make these referrals for you.


Prescriptions: 


Oxycodone HCl/Acetaminophen [Percocet 5-325 mg Tablet] 1 tab PO ASDIR PRN #15 

tablet


 PRN Reason: 


Referrals: 


[facility] [Provider Group] - Follow up as needed


[facility] [Provider Group] - 03/05/18 no rashes , no suspicious lesions , no areas of discoloration , no jaundice present , good turgor , no masses , no tenderness on palpation